# Patient Record
Sex: FEMALE | Race: WHITE | NOT HISPANIC OR LATINO | Employment: OTHER | URBAN - NONMETROPOLITAN AREA
[De-identification: names, ages, dates, MRNs, and addresses within clinical notes are randomized per-mention and may not be internally consistent; named-entity substitution may affect disease eponyms.]

---

## 2018-01-11 ENCOUNTER — OFFICE VISIT CONVERTED (OUTPATIENT)
Dept: FAMILY MEDICINE CLINIC | Age: 58
End: 2018-01-11
Attending: NURSE PRACTITIONER

## 2018-01-11 ENCOUNTER — CONVERSION ENCOUNTER (OUTPATIENT)
Dept: OTHER | Facility: HOSPITAL | Age: 58
End: 2018-01-11

## 2018-06-25 ENCOUNTER — OFFICE VISIT CONVERTED (OUTPATIENT)
Dept: FAMILY MEDICINE CLINIC | Age: 58
End: 2018-06-25
Attending: NURSE PRACTITIONER

## 2019-01-10 ENCOUNTER — OFFICE VISIT CONVERTED (OUTPATIENT)
Dept: FAMILY MEDICINE CLINIC | Age: 59
End: 2019-01-10
Attending: NURSE PRACTITIONER

## 2019-01-10 ENCOUNTER — HOSPITAL ENCOUNTER (OUTPATIENT)
Dept: OTHER | Facility: HOSPITAL | Age: 59
Discharge: HOME | End: 2019-01-10
Attending: NURSE PRACTITIONER

## 2019-01-10 LAB
ALBUMIN SERPL-MCNC: 3.9 G/DL (ref 3.5–5)
ALBUMIN/GLOB SERPL: 1.3 {RATIO} (ref 1.4–2.6)
ALP SERPL-CCNC: 59 U/L (ref 53–141)
ALT SERPL-CCNC: 19 U/L (ref 10–40)
ANION GAP SERPL CALC-SCNC: 15 MMOL/L (ref 8–19)
AST SERPL-CCNC: 19 U/L (ref 15–50)
BILIRUB SERPL-MCNC: 0.3 MG/DL (ref 0.2–1.3)
BUN SERPL-MCNC: 17 MG/DL (ref 5–25)
BUN/CREAT SERPL: 16 {RATIO} (ref 6–20)
CALCIUM SERPL-MCNC: 9.3 MG/DL (ref 8.7–10.4)
CHLORIDE SERPL-SCNC: 101 MMOL/L (ref 99–111)
CHOLEST SERPL-MCNC: 207 MG/DL (ref 107–200)
CHOLEST/HDLC SERPL: 3.8 {RATIO} (ref 3–6)
CONV CO2: 26 MMOL/L (ref 22–32)
CONV TOTAL PROTEIN: 6.8 G/DL (ref 6.3–8.2)
CREAT UR-MCNC: 1.06 MG/DL (ref 0.5–0.9)
GFR SERPLBLD BASED ON 1.73 SQ M-ARVRAT: 57 ML/MIN/{1.73_M2}
GLOBULIN UR ELPH-MCNC: 2.9 G/DL (ref 2–3.5)
GLUCOSE SERPL-MCNC: 84 MG/DL (ref 65–99)
HDLC SERPL-MCNC: 54 MG/DL (ref 40–60)
LDLC SERPL CALC-MCNC: 118 MG/DL (ref 70–100)
OSMOLALITY SERPL CALC.SUM OF ELEC: 287 MOSM/KG (ref 273–304)
POTASSIUM SERPL-SCNC: 4.2 MMOL/L (ref 3.5–5.3)
SODIUM SERPL-SCNC: 138 MMOL/L (ref 135–147)
TRIGL SERPL-MCNC: 177 MG/DL (ref 40–150)
VLDLC SERPL-MCNC: 35 MG/DL (ref 5–37)

## 2019-01-22 ENCOUNTER — HOSPITAL ENCOUNTER (OUTPATIENT)
Dept: OTHER | Facility: HOSPITAL | Age: 59
Discharge: HOME | End: 2019-01-22
Attending: NURSE PRACTITIONER

## 2019-08-20 ENCOUNTER — OFFICE VISIT CONVERTED (OUTPATIENT)
Dept: FAMILY MEDICINE CLINIC | Age: 59
End: 2019-08-20
Attending: NURSE PRACTITIONER

## 2019-08-26 ENCOUNTER — OFFICE VISIT CONVERTED (OUTPATIENT)
Dept: FAMILY MEDICINE CLINIC | Age: 59
End: 2019-08-26
Attending: FAMILY MEDICINE

## 2019-09-11 ENCOUNTER — HOSPITAL ENCOUNTER (OUTPATIENT)
Dept: OTHER | Facility: HOSPITAL | Age: 59
Discharge: HOME | End: 2019-09-11
Attending: NURSE PRACTITIONER

## 2019-09-11 LAB
ALBUMIN SERPL-MCNC: 4.2 G/DL (ref 3.5–5)
ALBUMIN/GLOB SERPL: 1.4 {RATIO} (ref 1.4–2.6)
ALP SERPL-CCNC: 68 U/L (ref 53–141)
ALT SERPL-CCNC: 19 U/L (ref 10–40)
ANION GAP SERPL CALC-SCNC: 17 MMOL/L (ref 8–19)
AST SERPL-CCNC: 20 U/L (ref 15–50)
BILIRUB SERPL-MCNC: 0.36 MG/DL (ref 0.2–1.3)
BUN SERPL-MCNC: 14 MG/DL (ref 5–25)
BUN/CREAT SERPL: 13 {RATIO} (ref 6–20)
CALCIUM SERPL-MCNC: 9.5 MG/DL (ref 8.7–10.4)
CHLORIDE SERPL-SCNC: 102 MMOL/L (ref 99–111)
CHOLEST SERPL-MCNC: 211 MG/DL (ref 107–200)
CHOLEST/HDLC SERPL: 3.8 {RATIO} (ref 3–6)
CONV CO2: 26 MMOL/L (ref 22–32)
CONV TOTAL PROTEIN: 7.1 G/DL (ref 6.3–8.2)
CREAT UR-MCNC: 1.06 MG/DL (ref 0.5–0.9)
GFR SERPLBLD BASED ON 1.73 SQ M-ARVRAT: 57 ML/MIN/{1.73_M2}
GLOBULIN UR ELPH-MCNC: 2.9 G/DL (ref 2–3.5)
GLUCOSE SERPL-MCNC: 101 MG/DL (ref 65–99)
HDLC SERPL-MCNC: 55 MG/DL (ref 40–60)
LDLC SERPL CALC-MCNC: 132 MG/DL (ref 70–100)
OSMOLALITY SERPL CALC.SUM OF ELEC: 293 MOSM/KG (ref 273–304)
POTASSIUM SERPL-SCNC: 4.1 MMOL/L (ref 3.5–5.3)
SODIUM SERPL-SCNC: 141 MMOL/L (ref 135–147)
TRIGL SERPL-MCNC: 120 MG/DL (ref 40–150)
VLDLC SERPL-MCNC: 24 MG/DL (ref 5–37)

## 2020-04-21 ENCOUNTER — OFFICE VISIT CONVERTED (OUTPATIENT)
Dept: FAMILY MEDICINE CLINIC | Age: 60
End: 2020-04-21
Attending: NURSE PRACTITIONER

## 2020-07-01 ENCOUNTER — OFFICE VISIT CONVERTED (OUTPATIENT)
Dept: FAMILY MEDICINE CLINIC | Age: 60
End: 2020-07-01
Attending: NURSE PRACTITIONER

## 2020-07-02 ENCOUNTER — OFFICE VISIT CONVERTED (OUTPATIENT)
Dept: OTOLARYNGOLOGY | Facility: CLINIC | Age: 60
End: 2020-07-02
Attending: OTOLARYNGOLOGY

## 2021-01-12 ENCOUNTER — OFFICE VISIT CONVERTED (OUTPATIENT)
Dept: FAMILY MEDICINE CLINIC | Age: 61
End: 2021-01-12
Attending: NURSE PRACTITIONER

## 2021-01-14 ENCOUNTER — HOSPITAL ENCOUNTER (OUTPATIENT)
Dept: OTHER | Facility: HOSPITAL | Age: 61
Discharge: HOME | End: 2021-01-14
Attending: NURSE PRACTITIONER

## 2021-01-14 LAB
ALBUMIN SERPL-MCNC: 4.2 G/DL (ref 3.5–5)
ALBUMIN/GLOB SERPL: 1.4 {RATIO} (ref 1.4–2.6)
ALP SERPL-CCNC: 66 U/L (ref 43–160)
ALT SERPL-CCNC: 20 U/L (ref 10–40)
ANION GAP SERPL CALC-SCNC: 15 MMOL/L (ref 8–19)
AST SERPL-CCNC: 20 U/L (ref 15–50)
BILIRUB SERPL-MCNC: 0.43 MG/DL (ref 0.2–1.3)
BUN SERPL-MCNC: 15 MG/DL (ref 5–25)
BUN/CREAT SERPL: 12 {RATIO} (ref 6–20)
CALCIUM SERPL-MCNC: 9.8 MG/DL (ref 8.7–10.4)
CHLORIDE SERPL-SCNC: 105 MMOL/L (ref 99–111)
CHOLEST SERPL-MCNC: 149 MG/DL (ref 107–200)
CHOLEST/HDLC SERPL: 2.8 {RATIO} (ref 3–6)
CONV CO2: 24 MMOL/L (ref 22–32)
CONV TOTAL PROTEIN: 7.2 G/DL (ref 6.3–8.2)
CREAT UR-MCNC: 1.28 MG/DL (ref 0.5–0.9)
GFR SERPLBLD BASED ON 1.73 SQ M-ARVRAT: 45 ML/MIN/{1.73_M2}
GLOBULIN UR ELPH-MCNC: 3 G/DL (ref 2–3.5)
GLUCOSE SERPL-MCNC: 99 MG/DL (ref 65–99)
HDLC SERPL-MCNC: 54 MG/DL (ref 40–60)
LDLC SERPL CALC-MCNC: 80 MG/DL (ref 70–100)
OSMOLALITY SERPL CALC.SUM OF ELEC: 291 MOSM/KG (ref 273–304)
POTASSIUM SERPL-SCNC: 4.2 MMOL/L (ref 3.5–5.3)
SODIUM SERPL-SCNC: 140 MMOL/L (ref 135–147)
TRIGL SERPL-MCNC: 75 MG/DL (ref 40–150)
VLDLC SERPL-MCNC: 15 MG/DL (ref 5–37)

## 2021-02-09 ENCOUNTER — HOSPITAL ENCOUNTER (OUTPATIENT)
Dept: OTHER | Facility: HOSPITAL | Age: 61
Discharge: HOME | End: 2021-02-09
Attending: NURSE PRACTITIONER

## 2021-02-09 LAB
ANION GAP SERPL CALC-SCNC: 16 MMOL/L (ref 8–19)
BUN SERPL-MCNC: 16 MG/DL (ref 5–25)
BUN/CREAT SERPL: 15 {RATIO} (ref 6–20)
CALCIUM SERPL-MCNC: 9.6 MG/DL (ref 8.7–10.4)
CHLORIDE SERPL-SCNC: 103 MMOL/L (ref 99–111)
CONV CO2: 25 MMOL/L (ref 22–32)
CREAT UR-MCNC: 1.1 MG/DL (ref 0.5–0.9)
GFR SERPLBLD BASED ON 1.73 SQ M-ARVRAT: 54 ML/MIN/{1.73_M2}
GLUCOSE SERPL-MCNC: 107 MG/DL (ref 65–99)
OSMOLALITY SERPL CALC.SUM OF ELEC: 290 MOSM/KG (ref 273–304)
POTASSIUM SERPL-SCNC: 4.6 MMOL/L (ref 3.5–5.3)
SODIUM SERPL-SCNC: 139 MMOL/L (ref 135–147)

## 2021-05-10 NOTE — H&P
History and Physical      Patient Name: Madai Ervin   Patient ID: [id number]   Sex: Female   YOB: 1960    Primary Care Provider: Aleah ROPER   Referring Provider: Aleah ROPER    Visit Date: July 2, 2020    Provider: Star Dobbs MD   Location: [facility]   Location Address: [address] [place] [address], [address]  [id number]   Location Phone: [phone]          Chief Complaint     1.  Hearing loss    2.  Tinnitus       History Of Present Illness  Madai Ervin is a 60 year old /White female who presents to the office today as a consult from Aleah ROPER.      She presents to the clinic today for evaluation of her ears and hearing.  She reports a longstanding history with ear issues.  She reports that when she was a child she had significant ear infections in her left ear and is unable to hear out of her left ear.  She reports her right ear she has noticed a decrease in her hearing over the past several years.  She also has right-sided, high-pitched tinnitus.  Approximately 10 days ago she started to notice an increase in the volume of the right-sided tinnitus.  She does not feel like her hearing in that ear change at all.  She was seen by her primary care provider who started her on a steroid pack.  She denies any otalgia or otorrhea.  She does have quite a lot of difficulty in hearing on a day-to-day basis.  She has bald and amplification system that she rarely uses.  She has not had an audiogram in many years.       Past Medical History  Anxiety; Hearing loss; Hyperlipidemia NEC/NOS; Hypertension, Benign Essential         Past Surgical History  EYE SURGERY; Hysterectomy         Medication List  Aspir-81 81 mg oral tablet,delayed release (DR/EC); Lescol 40 mg oral capsule; Lexapro 20 mg oral tablet; lisinopril 5 mg oral tablet; Vitamin D3 25 mcg (1,000 unit) oral capsule; Zyrtec 10 mg oral tablet         Allergy List  NO  "KNOWN DRUG ALLERGIES         Family Medical History  Family history of cancer; Family history of heart disease         Social History  Tobacco (Never)         Review of Systems  · Constitutional  o Denies  o : fever, night sweats, weight loss  · Eyes  o Denies  o : discharge from eye, impaired vision  · HENT  o Admits  o : *See HPI  · Cardiovascular  o Denies  o : chest pain, irregular heart beats  · Respiratory  o Denies  o : shortness of breath, wheezing, coughing up blood  · Gastrointestinal  o Denies  o : heartburn, reflux, vomiting blood  · Genitourinary  o Denies  o : frequency  · Integument  o Denies  o : rash, skin dryness  · Neurologic  o Denies  o : seizures, loss of balance, loss of consciousness, dizziness  · Endocrine  o Denies  o : cold intolerance, heat intolerance  · Heme-Lymph  o Denies  o : easy bleeding, anemia      Vitals  Date Time BP Position Site L\R Cuff Size HR RR TEMP (F) WT  HT  BMI kg/m2 BSA m2 O2 Sat HC       07/02/2020 10:32 AM        97.2 151lbs 0oz 5'  3\" 26.75 1.74           Physical Examination  · Constitutional  o Appearance  o : well developed, well-nourished, alert and in no acute distress, voice clear and strong  · Head and Face  o Head  o :   § Inspection  § : no deformities or lesions  o Face  o :   § Inspection  § : No facial lesions; House-Brackmann I/VI bilaterally  § Palpation  § : No TMJ crepitus nor  muscle tenderness bilaterally  · Eyes  o Vision  o :   § Visual Fields  § : Extraocular movements are intact. No spontaneous or gaze-induced nystagmus.  o Conjunctivae  o : clear  o Sclerae  o : clear  o Pupils and Irises  o : pupils equal, round, and reactive to light.   · Ears, Nose, Mouth and Throat  o Ears  o :   § External Ears  § : appearance within normal limits, no lesions present  § Otoscopic Examination  § : tympanic membrane appearance within normal limits bilaterally without perforations, well-aerated middle ears  § Hearing  § : intact to conversational " voice both ears  o Nose  o :   § External Nose  § : appearance normal  § Intranasal Exam  § : mucosa within normal limits, vestibules normal, no intranasal lesions present, septum midline, sinuses non tender to percussion  o Oral Cavity  o :   § Oral Mucosa  § : oral mucosa normal without pallor or cyanosis  § Lips  § : lip appearance normal  § Teeth  § : normal dentition for age  § Gums  § : gums pink, non-swollen, no bleeding present  § Tongue  § : tongue appearance normal; normal mobility  § Palate  § : hard palate normal, soft palate appearance normal with symmetric mobility  o Throat  o :   § Oropharynx  § : no inflammation or lesions present, tonsils within normal limits  § Hypopharynx  § : appearance within normal limits, superior epiglottis within normal limits  § Larynx  § : appearance within normal limits, vocal cords within normal limits, no lesions present  · Neck  o Inspection/Palpation  o : normal appearance, no masses or tenderness, trachea midline; thyroid size normal, nontender, no nodules or masses present on palpation  · Respiratory  o Respiratory Effort  o : breathing unlabored  o Inspection of Chest  o : normal appearance, no retractions  · Cardiovascular  o Heart  o : regular rate and rhythm  · Lymphatic  o Neck  o : no lymphadenopathy present  o Supraclavicular Nodes  o : no lymphadenopathy present  o Preauricular Nodes  o : no lymphadenopathy present  · Skin and Subcutaneous Tissue  o General Inspection  o : Regarding face and neck - there are no rashes present, no lesions present, and no areas of discoloration  · Neurologic  o Cranial Nerves  o : cranial nerves II-XII are grossly intact bilaterally  o Gait and Station  o : normal gait, able to stand without diffculty  · Psychiatric  o Judgement and Insight  o : judgment and insight intact  o Mood and Affect  o : mood normal, affect appropriate              Assessment  · Hearing loss     389.9/H91.90  · Tinnitus     388.30/H93.19    Problems  Reconciled  Plan  · Orders  o Audiometry, pure-tone (threshold); [facility] (44259) - 389.9/H91.90, 388.30/H93.19 - 07/06/2020  o Tympanogram (Impedance Testing) [facility] (91561) - 389.9/H91.90, 388.30/H93.19 - 07/06/2020  · Medications  o Medications have been Reconciled  o Transition of Care or Provider Policy  · Instructions  o She presents to the clinic today for evaluation of her ears and hearing. She reports a longstanding history with ear issues. She reports that when she was a child she had significant ear infections in her left ear and is unable to hear out of her left ear. She reports her right ear she has noticed a decrease in her hearing over the past several years. She also has right-sided, high-pitched tinnitus. Approximately 10 days ago she started to notice an increase in the volume of the right-sided tinnitus. She does not feel like her hearing in that ear change at all. She was seen by her primary care provider who started her on a steroid pack. She denies any otalgia or otorrhea. She does have quite a lot of difficulty in hearing on a day-to-day basis. She has bald and amplification system that she rarely uses. She has not had an audiogram in many years. On examination today bilateral tympanic membranes are within normal limits, without perforations. Her ears are well-aerated bilaterally. We did obtain an audiogram and tympanogram. The audiogram shows right-sided moderate sensorineural hearing loss rising to normal hearing at 1000 Hz and then sloping back down to profound sensorineural hearing loss. The left ear shows severe to profound sensorineural hearing loss. We were unable to test speech reception or word discrimination scores in the left ear. SRT in the right ear was at 35 dB and word discrimination scores were 88% at 70 dB. Tympanograms were within normal limits bilaterally. We do not have any records of any previous audiograms on her. She reports that it has been many many years since she  has had 1. I will have her start on a low-dose melatonin nightly to help with the tinnitus symptoms and have her finish her steroid pack. However, I do not feel like she is having a case of sudden sensorineural hearing loss. I encouraged her to look into hearing aids as I think this would be a great benefit to her. She does not seem to use her amplification system often and I feel like a hearing aid that is more suited to her specific hearing loss would be more beneficial. We have given her a copy of her audiogram today. I will plan to see her back on an as-needed basis.  o Electronically Identified Patient Education Materials Provided Electronically            Electronically Signed by: Star Dobbs MD -Author on August 25, 2020 04:23:11 PM

## 2021-05-15 VITALS — BODY MASS INDEX: 26.75 KG/M2 | TEMPERATURE: 97.2 F | HEIGHT: 63 IN | WEIGHT: 151 LBS

## 2021-05-18 NOTE — PROGRESS NOTES
Madai Ervin 1960     Office/Outpatient Visit    Visit Date: Thu, Josue 10, 2019 10:06 am    Provider: Aleah Garcia N.P. (Assistant: Temi Dia MA)    Location: [facility]        Electronically signed by Aleah Garcia N.P. on  01/10/2019 11:17:58 AM                             SUBJECTIVE:        CC:     Fabiola is a 59 year old White female.  This is a follow-up visit.  check up for med refills;         HPI:         Patient to be evaluated for anxiety.  Current treatment includes Lexapro.          Dx with hypercholesterolemia; current treatment includes Lescol.  Compliance with treatment has been good; she takes her medication as directed.  She denies experiencing any hypercholesterolemia related symptoms.  Most recent lab tests include ALT (SGPT):  19 (U/L) (07/06/2018), Creatinine, Serum:  1.17 (mg/dl) (11/19/2018), Glom Filt Rate, Est:  51 (ml/min/1.73m2) (11/19/2018), HDL:  60 (mg/dL) (07/06/2018), LDL:  102 (mg/dL) (07/06/2018), Total Cholesterol:  183 (mg/dL) (07/06/2018), Triglycerides:  106 (mg/dL) (07/06/2018).          Concerning essential hypertension, her current cardiac medication regimen includes an ACE inhibitor ( Zestril ).  Fabiola does not check her blood pressure other than at her clinic appointments.  She is tolerating the medication well without side effects.  Compliance with treatment has been good; she takes her medication as directed.          Additionally, she presents with history of screening breast exam - other.  her latest mammogram was 1-16-18.      ROS:     CONSTITUTIONAL:  Negative for chills, fatigue, fever, and weight change.      CARDIOVASCULAR:  Negative for chest pain, palpitations, tachycardia, orthopnea, and edema.      RESPIRATORY:  Negative for cough, dyspnea, and hemoptysis.      GASTROINTESTINAL:  Negative for melena and change in BM's.      INTEGUMENTARY/BREAST:  Negative for performance of self breast exams.      NEUROLOGICAL:  Negative for  dizziness, headaches, paresthesias, and weakness.      PSYCHIATRIC:  Positive for anxiety ( (when she comes in office and has to have blood work) ).          PMH/FMH/SH:     Last Reviewed on 1/10/2019 11:14 AM by Aleah Garcia    Past Medical History:         Hyperlipidemia         GYNECOLOGICAL HISTORY:    ; 1 adopted         PREVENTIVE HEALTH MAINTENANCE             BONE DENSITY: was last done 3/8/14 with the following abnormality noted-- osteopenia     COLORECTAL CANCER SCREENING: Up to date (colonoscopy q10y; sigmoidoscopy q5y; Cologuard q3y) was last done 09, Results are in chart; colonoscopy with the following abnormalities noted-- diverticulosis     Hepatitis C Medicare Screening: was last done ; negative     MAMMOGRAM: Done within last 2 years and results in are chart was last done 18 stable         Surgical History:         Hysterectomy: ; Other Surgeries    abdominal surgery/ovarian cyst;    eye surgery ;         Family History:     Father:  at age 30's; Cause of death was accidental death     Mother: Coronary Artery Disease; Hypertension; Hyperlipidemia     Brother(s): 4 brother(s) total; 1 ;  Lymphoma     Sister(s): 3 sister(s) total;  Cancer (unspecified type)     Paternal Grandfather:      Paternal Grandmother: ;  Type 2 Diabetes     Maternal Grandfather: Cause of death was heart     Maternal Grandmother:  at age 30's; Cause of death was heart         Social History:     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          Alcohol:  Does not drink alcohol and never has.          Substance Abuse History:     None             Immunizations:     Hep A, adult dose 2018     Hep A, adult dose 2018     Flulaval 2011     Fluzone (3 + years dose)  11/29/2012     Fluzone pf (3+ years dose) 11/20/2013     Fluzone Quadrivalent (3+ years) 11/19/2018     Influenza A (H1N1) NASAL, Monovalent Live 12/11/2009     Adacel (Tdap) 5/5/2010         Allergies:     Last Reviewed on 1/10/2019 10:11 AM by Temi Dia      No Known Drug Allergies.         Current Medications:     Last Reviewed on 1/10/2019 10:13 AM by Temi Dia    Lexapro 20mg Tablet Take 1 tablet(s) by mouth daily     Lescol XL 80mg Tablets, Extended Release Take 1 tablet(s) by mouth each evening     Lisinopril 5mg Tablet 1 tab daily     Valtrex 1gm Tablet One tablet BID X 2 days PRN     Aspirin (ASA) 81mg Chewable Tablet Chew 1 tablet(s) by mouth daily     Zyrtec 10mg Tablet 1 tab daily     Vitamin D3 1,000IU Tablet 3 tablets daily         OBJECTIVE:        Vitals:         Current: 1/10/2019 10:15:12 AM    Ht:  5 ft, 3 in;  Wt: 150.4 lbs;  BMI: 26.6    T: 98.4 F (oral);  BP: 150/84 mm Hg (left arm, sitting);  P: 69 bpm (left arm (BP Cuff), sitting);  sCr: 1.17 mg/dL;  GFR: 50.28        Repeat:     10:37:09 AM     BP:   144/82mm Hg (left arm, sitting)         Exams:     PHYSICAL EXAM:     GENERAL: vital signs recorded - well developed, well nourished;  no apparent distress;     NECK: carotid exam reveals no bruits;     RESPIRATORY: normal respiratory rate and pattern with no distress; normal breath sounds with no rales, rhonchi, wheezes or rubs;     CARDIOVASCULAR: normal rate; rhythm is regular;  no systolic murmur; no edema;     LYMPHATIC: no enlargement of cervical or facial nodes;     BREAST/INTEGUMENT: BREASTS: breast exam is normal without masses, skin changes, or nipple discharge;     PSYCHIATRIC:  appropriate affect and demeanor; normal speech pattern; grossly normal memory;         ASSESSMENT           300.02   F41.9  Anxiety              DDx:     272.0   E78.2  Hypercholesterolemia              DDx:     401.1   I10  Essential hypertension              DDx:     V76.19   Z12.39   Screening breast exam - other              DDx:     V76.51   Z12.11  Screening for cancer of colon              DDx:         ORDERS:         Meds Prescribed:       Refill of: Lexapro (Escitalopram Oxalate) 20mg Tablet Take 1 tablet(s) by mouth daily  #90 (Ninety) tablet(s) Refills: 1       Refill of: Lescol XL (Fluvastatin) 80mg Tablets, Extended Release Take 1 tablet(s) by mouth each evening  #90 (Ninety) tablet(s) Refills: 1       Refill of: Lisinopril 5mg Tablet 1 tab daily  #90 (Ninety) tablet(s) Refills: 1         Radiology/Test Orders:       [id number]  Screening digital breast tomosynthesis bi  (Send-Out)           Lab Orders:       [id number]  [facility] CMP AND LIPID: [id number], [id number]  (Send-Out)           Procedures Ordered:       REFER  Referral to Specialist or Other Facility  (Send-Out)                   PLAN:          Anxiety           Prescriptions:       Refill of: Lexapro (Escitalopram Oxalate) 20mg Tablet Take 1 tablet(s) by mouth daily  #90 (Ninety) tablet(s) Refills: 1          Hypercholesterolemia     LABORATORY:  Labs ordered to be performed today include HTN/Lipid Panel: CMP, Lipid.            Prescriptions:       Refill of: Lescol XL (Fluvastatin) 80mg Tablets, Extended Release Take 1 tablet(s) by mouth each evening  #90 (Ninety) tablet(s) Refills: 1           Orders:       [id number]  [facility] CMP AND LIPID: [id number], [id number]  (Send-Out)            Essential hypertension         RECOMMENDATIONS given include: perform routine monitoring of blood pressure with home blood pressure cuff, reduction of dietary salt intake, and stress reduction.      FOLLOW-UP: Schedule a follow-up visit in 6 months.            Prescriptions:       Refill of: Lisinopril 5mg Tablet 1 tab daily  #90 (Ninety) tablet(s) Refills: 1          Screening breast exam - other had flu and hep a vaccine /does not do BSE /after jan 16         RADIOLOGY:  I have ordered Screening 3D Mammogram Bilateral to be done today.            Orders:        [id number]  Screening digital breast tomosynthesis bi  (Send-Out)            Screening for cancer of colon requested dr tapia         REFERRALS:  Referral initiated to a general surgeon ( Dr. Faizan Tapia; a colonoscopy ).            Orders:       REFER  Referral to Specialist or Other Facility  (Send-Out)             Patient Education Handouts:       Colonoscopy              Patient Recommendations:        For  Essential hypertension:     Begin monitoring your blood pressure by brief nurse visits at our office, a home blood pressure monitor, or by checking on the machines in pharmacies or stores.  Keep a log of the readings. Reduce the amount of salt in your diet. Try and reduce the effects of stress on blood pressure by relaxation, meditation, biofeedback, exercise or other stress reduction methods.  Schedule a follow-up visit in 6 months.              CHARGE CAPTURE           **Please note: ICD descriptions below are intended for billing purposes only and may not represent clinical diagnoses**        Primary Diagnosis:         300.02 Anxiety            F41.9    Anxiety disorder, unspecified              Orders:          [id number]   Office/outpatient visit; established patient, level 4  (In-House)           272.0 Hypercholesterolemia            E78.2    Mixed hyperlipidemia    401.1 Essential hypertension            I10    Essential (primary) hypertension    V76.19 Screening breast exam - other            Z12.39    Encounter for other screening for malignant neoplasm of breast    V76.51 Screening for cancer of colon            Z12.11    Encounter for screening for malignant neoplasm of colon        ADDENDUMS:      ____________________________________    Addendum: 01/11/2019 04:54 PM - [facility]         Visit Note Faxed to:        Faizan Tapia  (General Surgery); Number [phone]
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
## 2021-05-18 NOTE — PROGRESS NOTES
Madai Ervin 1960     Office/Outpatient Visit    Visit Date: Tue, Aug 20, 2019 09:49 am    Provider: Aleah Garcia N.P. (Assistant: Temi Dia MA)    Location: [facility]        Electronically signed by Aleah Garcia N.P. on  2019 12:19:28 PM                             SUBJECTIVE:        CC:     Fabiola is a 59 year old White female.  This is a follow-up visit.  med refills;         HPI:         Fabiola presents with hypercholesterolemia.  Current treatment includes Lescol.  Compliance with treatment has been good; she takes her medication as directed.  She denies experiencing any hypercholesterolemia related symptoms.  Most recent lab tests include Weight (lb):  150.2 (2019), Systolic BP:  133 (2019), Diastolic BP:  78 (2019), Total Cholesterol:  207 (mg/dL) (01/10/2019), Triglycerides:  177 (mg/dL) (01/10/2019), LDL:  118 (mg/dL) (01/10/2019), ALT (SGPT):  19 (U/L) (01/10/2019).          Additionally, she presents with history of essential hypertension.  her current cardiac medication regimen includes an ACE inhibitor ( Prinivil ).  Fabiola does not check her blood pressure other than at her clinic appointments.  She is tolerating the medication well without side effects.  Compliance with treatment has been good; she takes her medication as directed.      ROS:     CONSTITUTIONAL:  Negative for chills, fatigue, fever, and weight change.      CARDIOVASCULAR:  Negative for chest pain, palpitations, tachycardia, orthopnea, and edema.      RESPIRATORY:  Negative for cough, dyspnea, and hemoptysis.      NEUROLOGICAL:  Negative for dizziness, headaches, paresthesias, and weakness.      PSYCHIATRIC:  Positive for doing well on lexapro, wants refills.          PM/FMH/SH:     Last Reviewed on 2019 10:00 AM by Aleah Garcia    Past Medical History:         Hyperlipidemia         GYNECOLOGICAL HISTORY:    ; 1 adopted         PREVENTIVE HEALTH MAINTENANCE              BONE DENSITY: was last done 3/8/14 with the following abnormality noted-- osteopenia     COLORECTAL CANCER SCREENING: Up to date (colonoscopy q10y; sigmoidoscopy q5y; Cologuard q3y) was last done 09, Results are in chart; colonoscopy with the following abnormalities noted-- diverticulosis     Hepatitis C Medicare Screening: was last done ; negative     MAMMOGRAM: Done within last 2 years and results in are chart was last done 18 stable         Surgical History:         Hysterectomy: ; Other Surgeries    abdominal surgery/ovarian cyst;    eye surgery ;         Family History:     Father:  at age 30's; Cause of death was accidental death     Mother: Coronary Artery Disease; Hypertension; Hyperlipidemia     Brother(s): 4 brother(s) total; 1 ;  Lymphoma     Sister(s): 3 sister(s) total;  Cancer (unspecified type)     Paternal Grandfather:      Paternal Grandmother: ;  Type 2 Diabetes     Maternal Grandfather: Cause of death was heart     Maternal Grandmother:  at age 30's; Cause of death was heart         Social History:     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          Alcohol:  Does not drink alcohol and never has.          Substance Abuse History:     None             Immunizations:     Hep A, adult dose 2018     Hep A, adult dose 2018     Flulaval 2011     Fluzone (3 + years dose) 2012     Fluzone pf (3+ years dose) 2013     Fluzone Quadrivalent (3+ years) 2018     Influenza A (H1N1) NASAL, Monovalent Live 2009     Adacel (Tdap) 2010         Allergies:     Last Reviewed on 2019 09:52 AM by Temi Dia      No Known Drug Allergies.         Current Medications:     Last Reviewed on 2019 09:53 AM by  Temi Dia    Lexapro 20mg Tablet Take 1 tablet(s) by mouth daily     Lescol XL 80mg Tablets, Extended Release Take 1 tablet(s) by mouth each evening     Lisinopril 5mg Tablet 1 tab daily     Valtrex 1gm Tablet One tablet BID X 2 days PRN     Aspirin (ASA) 81mg Chewable Tablet Chew 1 tablet(s) by mouth daily     Zyrtec 10mg Tablet 1 tab daily     Vitamin D3 1,000IU Tablet 3 tablets daily         OBJECTIVE:        Vitals:         Current: 8/20/2019 9:54:49 AM    Ht:  5 ft, 3 in;  Wt: 150.2 lbs;  BMI: 26.6    T: 97.3 F (oral);  BP: 133/78 mm Hg (left arm, sitting);  P: 64 bpm (left arm (BP Cuff), sitting);  sCr: 1.06 mg/dL;  GFR: 55.46        Exams:     PHYSICAL EXAM:     GENERAL: vital signs recorded - well developed, well nourished;  no apparent distress;     NECK: carotid exam reveals no bruits;     RESPIRATORY: normal respiratory rate and pattern with no distress; normal breath sounds with no rales, rhonchi, wheezes or rubs;     CARDIOVASCULAR: normal rate; rhythm is regular;  no systolic murmur; no edema;     PSYCHIATRIC:  appropriate affect and demeanor; normal speech pattern; grossly normal memory;         ASSESSMENT           272.0   E78.2  Hypercholesterolemia              DDx:     401.1   I10  Essential hypertension              DDx:     V76.51   Z12.11  Screening for cancer of colon              DDx:     300.02   F41.9  Anxiety              DDx:         ORDERS:         Meds Prescribed:       Refill of: Lexapro (Escitalopram Oxalate) 20mg Tablet Take 1 tablet(s) by mouth daily  #90 (Ninety) tablet(s) Refills: 0       Refill of: Lescol XL (Fluvastatin) 80mg Tablets, Extended Release Take 1 tablet(s) by mouth each evening  #90 (Ninety) tablet(s) Refills: 0       Refill of: Lisinopril 5mg Tablet 1 tab daily  #90 (Ninety) tablet(s) Refills: 0         Lab Orders:       FUTURE  Future order to be done at patients convenience  (Send-Out)         [id number]  [facility] CMP AND LIPID: [id number]  (Send-Out)            Procedures Ordered:       REFER  Referral to Specialist or Other Facility  (Send-Out)                   PLAN:          Hypercholesterolemia         FOLLOW-UP TESTING #1: FOLLOW-UP LABORATORY:  Labs to be scheduled in the future include HTN/Lipid Panel: CMP, Lipid.      RECOMMENDATIONS given include: exercise and low cholesterol/low fat diet.      FOLLOW-UP: fasting for labs           Prescriptions:       Refill of: Lescol XL (Fluvastatin) 80mg Tablets, Extended Release Take 1 tablet(s) by mouth each evening  #90 (Ninety) tablet(s) Refills: 0           Orders:       FUTURE  Future order to be done at patients convenience  (Send-Out)         [id number]  [facility] CMP AND LIPID: [id number]  (Send-Out)            Essential hypertension           Prescriptions:       Refill of: Lisinopril 5mg Tablet 1 tab daily  #90 (Ninety) tablet(s) Refills: 0          Screening for cancer of colon canceled Dr Modi, wants to see Dr Bautista         REFERRALS:  Referral initiated to a general surgeon ( Dr. Norm Bautista; a colonoscopy ).            Orders:       REFER  Referral to Specialist or Other Facility  (Send-Out)            Anxiety           Prescriptions:       Refill of: Lexapro (Escitalopram Oxalate) 20mg Tablet Take 1 tablet(s) by mouth daily  #90 (Ninety) tablet(s) Refills: 0             Patient Recommendations:        For  Hypercholesterolemia:             The following laboratory testing has been ordered: Maintain a regular exercise program. Reduce the amount of cholesterol and saturated fat in your diet.              CHARGE CAPTURE           **Please note: ICD descriptions below are intended for billing purposes only and may not represent clinical diagnoses**        Primary Diagnosis:         272.0 Hypercholesterolemia            E78.2    Mixed hyperlipidemia              Orders:          [id number]   Office/outpatient visit; established patient, level 4  (In-House)           401.1 Essential hypertension             I10    Essential (primary) hypertension    V76.51 Screening for cancer of colon            Z12.11    Encounter for screening for malignant neoplasm of colon    300.02 Anxiety            F41.9    Anxiety disorder, unspecified

## 2021-05-18 NOTE — PROGRESS NOTES
Madai Ervin 1960     Office/Outpatient Visit    Visit Date: Mon, Aug 26, 2019 10:43 am    Provider: Octavio Wakefield MD (Assistant: Beverley Galvez MA)    Location: [facility]        Electronically signed by Octavio Wakefield MD on  2019 12:50:19 PM                             SUBJECTIVE:        CC:     Fabiola is a 59 year old White female.  possible sinus infection?;         HPI:     Pt has nasal, sinus, and throat congestion. Fatigue. Sx started a couple days ago. Productive cough. Maybe a fever, unsure. No sore throat. She is on zyrtec.     ROS:     CONSTITUTIONAL:  Positive for fatigue and fever ( low grade ).      E/N/T:  Positive for nasal congestion and sinus pressure.      CARDIOVASCULAR:  Negative for chest pain, palpitations, tachycardia, orthopnea, and edema.      RESPIRATORY:  Positive for recent cough ( with scant amounts of purulent sputum ).      NEUROLOGICAL:  Negative for dizziness, headaches, paresthesias, and weakness.      PSYCHIATRIC:  Positive for doing well on lexapro, wants refills.          [facility]/:     Last Reviewed on 2019 11:21 AM by Octavio Wakefield    Past Medical History:         Hyperlipidemia         GYNECOLOGICAL HISTORY:    ; 1 adopted         PREVENTIVE HEALTH MAINTENANCE             BONE DENSITY: was last done 3/8/14 with the following abnormality noted-- osteopenia     COLORECTAL CANCER SCREENING: Up to date (colonoscopy q10y; sigmoidoscopy q5y; Cologuard q3y) was last done 09, Results are in chart; colonoscopy with the following abnormalities noted-- diverticulosis     Hepatitis C Medicare Screening: was last done ; negative     MAMMOGRAM: Done within last 2 years and results in are chart was last done 18 stable         Surgical History:         Hysterectomy: ; Other Surgeries    abdominal surgery/ovarian cyst;    eye surgery ;         Family History:     Father:  at age 30's; Cause of death was accidental  death     Mother: Coronary Artery Disease; Hypertension; Hyperlipidemia;  PUD     Brother(s): 4 brother(s) total; 1 ;  Lymphoma     Sister(s): 3 sister(s) total;  Cancer (unspecified type)     Paternal Grandfather:      Paternal Grandmother: ;  Type 2 Diabetes     Maternal Grandfather: Cause of death was heart     Maternal Grandmother:  at age 30's; Cause of death was heart         Social History:     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          Alcohol:  Does not drink alcohol and never has.          Substance Abuse History:     Last Reviewed on 2019 11:21 AM by Octavio Wakefield    None         Mental Health History:     Last Reviewed on 2019 11:21 AM by Octavio Wakefield        Communicable Diseases (eg STDs):     Last Reviewed on 2019 11:21 AM by Octavio Wakefield            Current Problems:     Last Reviewed on 2019 11:21 AM by Octavio Wakefield    Screening for cancer of colon     Family history of coronary artery disease     Body Aches, pain     Fatigue     Heel pain     Insect bite     Arm pain     Osteopenia     Use of high risk medications     Essential hypertension     Back pain     Hypercholesterolemia     Anxiety     Other specified forms of hearing loss, NEC     Acute maxillary sinusitis     Acute sinusitis, other         Immunizations:     Hep A, adult dose 2018     Hep A, adult dose 2018     Flulaval 2011     Fluzone (3 + years dose) 2012     Fluzone pf (3+ years dose) 2013     Fluzone Quadrivalent (3+ years) 2018     Influenza A (H1N1) NASAL, Monovalent Live 2009     Adacel (Tdap) 2010         Allergies:     Last Reviewed on 2019 11:21 AM by Octavio Wakefield      No Known Drug Allergies.         Current Medications:      Last Reviewed on 8/26/2019 11:21 AM by Octavio Wakefield    Lescol XL 80mg Tablets, Extended Release Take 1 tablet(s) by mouth each evening     Lexapro 20mg Tablet Take 1 tablet(s) by mouth daily     Lisinopril 5mg Tablet 1 tab daily     Valtrex 1gm Tablet One tablet BID X 2 days PRN     Aspirin (ASA) 81mg Chewable Tablet Chew 1 tablet(s) by mouth daily     Zyrtec 10mg Tablet 1 tab daily     Vitamin D3 1,000IU Tablet 3 tablets daily         OBJECTIVE:        Vitals:         Current: 8/26/2019 10:49:29 AM    Ht:  5 ft, 3 in;  Wt: 151.4 lbs;  BMI: 26.8    T: 98.7 F (oral);  BP: 126/80 mm Hg (right arm, sitting);  P: 78 bpm (right arm (BP Cuff), sitting);  sCr: 1.06 mg/dL;  GFR: 55.65        Exams:     PHYSICAL EXAM:     GENERAL: vital signs recorded - well developed, well nourished;  well groomed;  no apparent distress;     EYES: PERRL, EOMI     E/N/T: EARS:  normal external auditory canals and tympanic membranes;  grossly normal hearing; NOSE: nasal mucosa is boggy;  OROPHARYNX: posterior pharynx shows erythema;     NECK: range of motion is normal; thyroid exam reveals not enlarged;     RESPIRATORY: normal respiratory rate and pattern with no distress; normal breath sounds with no rales, rhonchi, wheezes or rubs;     CARDIOVASCULAR: normal rate; rhythm is regular;  no systolic murmur; no edema;     GASTROINTESTINAL: nontender; normal bowel sounds;     LYMPHATIC: no enlargement of cervical or facial nodes;     MUSCULOSKELETAL: normal gait;     NEUROLOGIC: mental status: alert and oriented x 3;     PSYCHIATRIC:  appropriate affect and demeanor; normal speech pattern; grossly normal memory;         ASSESSMENT           461.0   J01.00  Acute maxillary sinusitis              DDx:         ORDERS:         Meds Prescribed:       Flonase Allergy Relief (Fluticasone Propionate) 50mcg/1actuation Nasal Spray 1 spray eac nostril once daily x 2 weeks then once daily prn allergy symptoms  #16 (Sixteen) ml Refills: 0       Singulair   (Montelukast Sodium) 10mg Tablet Take 1 tablet(s) by mouth each evening  #30 (Thirty) tablet(s) Refills: 2                 PLAN:          Acute maxillary sinusitis Presentation c/w viral URI or seasonal allergies. If not better by Thursday or Friday, pt asha call in and I'll send in azirthomycin or doxycycline. Pt counseled to take flonase and singulair in addition to zyrtec.           Prescriptions:       Flonase Allergy Relief (Fluticasone Propionate) 50mcg/1actuation Nasal Spray 1 spray eac nostril once daily x 2 weeks then once daily prn allergy symptoms  #16 (Sixteen) ml Refills: 0       Singulair  (Montelukast Sodium) 10mg Tablet Take 1 tablet(s) by mouth each evening  #30 (Thirty) tablet(s) Refills: 2             CHARGE CAPTURE           **Please note: ICD descriptions below are intended for billing purposes only and may not represent clinical diagnoses**        Primary Diagnosis:         461.0 Acute maxillary sinusitis            J01.00    Acute maxillary sinusitis, unspecified              Orders:          35161   Office/outpatient visit; established patient, level 3  (In-House)

## 2021-05-18 NOTE — PROGRESS NOTES
Madai Ervin 1960     Office/Outpatient Visit    Visit Date: Thu, Jan 11, 2018 10:09 am    Provider: Aleah Garcia N.P. (Assistant: Ngoc Charles MA)    Location: [facility]        Electronically signed by Aleah Garcia N.P. on  01/11/2018 10:56:00 AM                             SUBJECTIVE:        CC:     Fabiola is a 58 year old White female.  Well Woman Exam;         HPI:         Patient to be evaluated for well Woman Exam.  She cannot recall when she last had a physical exam.  She is status-post hysterectomy.  She does not perform breast self-exams.    Her last Pap smear was in 11/29/2012 and was normal.   Her last mammogram was in 3/18/2014 and was normal.   Her last DEXA was in 3/18/2014 and showed osteopenia.   Preventative Health updated today.  Fabiola denies any history of abnormal Pap smears.  Tobacco: She has never smoked.          ROS:     CONSTITUTIONAL:  Negative for chills, fatigue, fever, and weight change.      EYES:  Negative for blurred vision, eye pain, and photophobia.      E/N/T:  Negative for hearing problems, E/N/T pain, congestion, rhinorrhea, epistaxis, hoarseness, and dental problems.      CARDIOVASCULAR:  Negative for chest pain, palpitations, tachycardia, orthopnea, and edema.      RESPIRATORY:  Negative for cough, dyspnea, and hemoptysis.      GASTROINTESTINAL:  Negative for abdominal pain, heartburn, constipation, diarrhea, and stool changes.      GENITOURINARY:  Negative for genital lesions, hematuria, menstrual problems, polyuria, abnormal vaginal bleeding, and vaginal discharge.      MUSCULOSKELETAL:  Negative for arthralgias, back pain, and myalgias.      INTEGUMENTARY/BREAST:  Negative for atypical moles, dry skin, pruritis, rashes, breast masses, and nipple discharge.      NEUROLOGICAL:  Negative for dizziness, headaches, paresthesias, and weakness.      ENDOCRINE:  Negative for hair loss, heat/cold intolerance, polydipsia, and polyphagia.       PSYCHIATRIC:  Negative for anxiety, depression, and sleep disturbances.          PMH/FMH/SH:     Last Reviewed on 2018 10:38 AM by Aleah Garcia    Past Medical History:         Hyperlipidemia         GYNECOLOGICAL HISTORY:    ; 1 adopted         PREVENTIVE HEALTH MAINTENANCE             BONE DENSITY: was last done 3/8/14 with the following abnormality noted-- osteopenia     COLORECTAL CANCER SCREENING: Up to date (colonoscopy q10y; sigmoidoscopy q5y; Cologuard q3y) was last done 09, Results are in chart; colonoscopy with the following abnormalities noted-- diverticulosis     MAMMOGRAM: was last done 3/8/14 with normal results         Surgical History:         Hysterectomy: ; Other Surgeries    abdominal surgery/ovarian cyst;    eye surgery ;         Family History:     Father:  at age 30's; Cause of death was accidental death     Mother: Coronary Artery Disease; Hypertension; Hyperlipidemia     Brother(s): 4 brother(s) total; 1 ;  Lymphoma     Sister(s): 3 sister(s) total;  Cancer (unspecified type)     Paternal Grandfather:      Paternal Grandmother: ;  Type 2 Diabetes     Maternal Grandfather: Cause of death was heart     Maternal Grandmother:  at age 30's; Cause of death was heart         Social History:     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          Alcohol:  Does not drink alcohol and never has.          Substance Abuse History:     None             Immunizations:     Flulaval 2011     Fluzone (3 + years dose) 2012     Fluzone pf (3+ years dose) 2013     Influenza A (H1N1) NASAL, Monovalent Live 2009     Adacel (Tdap) 2010         Allergies:     Last Reviewed on 2018 10:24 AM by Ngoc Charles      No Known Drug Allergies.          Current Medications:     Last Reviewed on 1/11/2018 10:25 AM by Ngoc Charles    Valtrex 1gm Tablet One tablet BID X 2 days PRN     Lescol XL 80mg Tablets, Extended Release Take 1 tablet(s) by mouth each evening     Lexapro 20mg Tablet Take 1 tablet(s) by mouth daily     Lisinopril 5mg Tablet 1 tab daily     Aspirin (ASA) 81mg Chewable Tablet Chew 1 tablet(s) by mouth daily     Vitamin D3 1,000IU Tablet 3 tablets daily     Zyrtec 10mg Tablet 1 tab daily         OBJECTIVE:        Vitals:         Current: 1/11/2018 10:17:09 AM    Ht:  5 ft, 3 in;  Wt: 144.3 lbs;  BMI: 25.6    T: 98.2 F (oral);  BP: 138/84 mm Hg (left arm, sitting);  P: 72 bpm (left arm (BP Cuff), sitting);  sCr: 0.99 mg/dL;  GFR: 59.09        Exams:     PHYSICAL EXAM:     GENERAL: vital signs recorded - well developed, well nourished;  no apparent distress;     EYES: extraocular movements intact; conjunctiva and cornea are normal; PERRLA;     E/N/T:  normal EACs, TMs, nasal/oral mucosa, teeth, gingiva, and oropharynx;     NECK: range of motion is normal; thyroid is non-palpable;     RESPIRATORY: normal respiratory rate and pattern with no distress; normal breath sounds with no rales, rhonchi, wheezes or rubs;     CARDIOVASCULAR: normal rate; rhythm is regular;  no systolic murmur; no edema;     GASTROINTESTINAL: nontender; normal bowel sounds; no organomegaly; rectal exam: normal tone; nontender, guaiac negative stool;     GENITOURINARY: external genitalia: normal without lesions or urethral abnormalities;; vagina: atrophic mucosa; ; cervix: absent (s/p hyst) noted;;  adnexa: normal with no masses or tenderness     LYMPHATIC: no enlargement of cervical or facial nodes; no axillary adenopathy;     BREAST/INTEGUMENT: BREASTS: breast exam is normal without masses, skin changes, or nipple discharge; SKIN: no significant rashes or lesions; no suspicious moles;     MUSCULOSKELETAL:  Normal range of motion, strength and tone;     NEUROLOGIC: GROSSLY  INTACT     PSYCHIATRIC:  appropriate affect and demeanor; normal speech pattern; grossly normal memory;         Lab/Test Results:             Glucose, Urine:  Neg (01/11/2018),     Bilirubin, urine:  Negative (01/11/2018),     Ketones, Urine Strip:  Negative (01/11/2018),     Specific Standard, urine:  1.030 (01/11/2018),     Blood in Urine:  negative (01/11/2018),     pH, urine:  5.5 (01/11/2018),     Protein Urine QL:  negative (01/11/2018),     Urobilinogen, urine:  0.2 E.U./dL (01/11/2018),     Nitrite, Urine:  Negative (01/11/2018),     Leukoctyes, urine:  Trace (01/11/2018),     Appearance:  Clear (01/11/2018),     collection source:  Clean-catch (01/11/2018),     Color:  Yellow (01/11/2018),     Performed by::  WONG (01/11/2018),             ASSESSMENT:           V72.31     Well Woman Exam     272.0   E78.5  Hypercholesterolemia              DDx:     V69.8   Z72.89  Other problems related to lifestyle              DDx:     V76.41   Z12.12  Screening for rectal cancer              DDx:         ORDERS:         Radiology/Test Orders:       [id number]  Screening mammography bi 2-view inc CAD  (Send-Out)           Lab Orders:       [id number]  Urinalysis, automated, without microscopy  (In-House)         FUTURE  Future order to be done at patients convenience  (Send-Out)         [id number]  [facility] - [facility] Hepatitis C antibody  (Send-Out)         FUTURE  Future order to be done at patients convenience  (Send-Out)         39183  [facility] - [facility] CMP AND LIPID: [id number], [id number]  (Send-Out)         57239  Occult blood, fecal  (In-House)                   PLAN:          Well Woman Exam declines dexa due to cost         RADIOLOGY:  I have ordered screening mammogram to be done today.      COUNSELING was provided today regarding the following topics: breast self-exam.      FOLLOW-UP: Schedule follow-up appointments on a p.r.n. basis.            Orders:      [id number]  Urinalysis, automated, without microscopy  (In-House)         03149  Screening  mammography bi 2-view inc CAD  (Send-Out)            Hypercholesterolemia         FOLLOW-UP TESTING #1: FOLLOW-UP LABORATORY:  Labs to be scheduled in the future include HTN/Lipid Panel: CMP, Lipid.      FOLLOW-UP: fasting for labs, order given           Orders:       FUTURE  Future order to be done at patients convenience  (Send-Out)         63441  [facility] CMP AND LIPID: [id number], [id number]  (Send-Out)            Other problems related to lifestyle         FOLLOW-UP TESTING #1: FOLLOW-UP LABORATORY:  Labs to be scheduled in the future include Hepatitis C  Screen Antibody.            Orders:       FUTURE  Future order to be done at patients convenience  (Send-Out)         63808  [facility] Hepatitis C antibody  (Send-Out)             Patient Education Handouts:       Hepatitis C           Screening for rectal cancer           Orders:       [id number]  Occult blood, fecal  (In-House)   [facility]             Patient Recommendations:        For  Well Woman Exam:         You should regularly examine your breasts, easily done while in the shower or with lotion.  Feel and look for differences in consistency from month to month, especially noting knots or lumps, changes in skin appearance, nipple retraction or discharge.  Schedule follow-up appointments as needed.          For  Hypercholesterolemia:             The following laboratory testing has been ordered:         For  Other problems related to lifestyle:             The following laboratory testing has been ordered:             CHARGE CAPTURE:           Primary Diagnosis:     V72.31    Well Woman Exam                Z01.419    Encounter for gynecological examination (general) (routine) without abnormal findings                       Orders:          [id number]   Preventive medicine, established patient, age 40-64 years  (In-House)             01135   Urinalysis, automated, without microscopy  (In-House)           272.0 Hypercholesterolemia            E78.5    Hyperlipidemia,  unspecified    V69.8 Other problems related to lifestyle            Z72.89    Other problems related to lifestyle    V76.41 Screening for rectal cancer            Z12.12    Encounter for screening for malignant neoplasm of rectum              Orders:          [id number]   Occult blood, fecal  (In-House)

## 2021-05-18 NOTE — PROGRESS NOTES
Madai Ervin  1960     Office/Outpatient Visit    Visit Date: Tue, Apr 21, 2020 10:30 am    Provider: Aleah Garcia N.P. (Assistant: Vianca Friedman MA)    Location: [facility]        Electronically signed by Aleah Garcia N.P. on  04/21/2020 10:50:10 AM                             Subjective:        CC: TELEHEALTH- CONSENT BY    VIDEO -[phone] is a 60 year old White female.  This is a follow-up visit.          HPI: TELEMEDICINE VISIT:    - Fabiola  consented to this telemedicine visit.    - Persons present during the telemedicine consultation include: Fabiola - patient, JEREMIE Garcia APRN    - This visit is being conducted over FaceTime with audio and video.                  Fabiola presents with mixed hyperlipidemia.  Current treatment includes Lescol.  Compliance with treatment has been good; she takes her medication as directed.  She denies experiencing any hypercholesterolemia related symptoms.  Most recent lab tests include Glucose, Serum:  101 (mg/dL) (09/11/2019), Creatinine, Serum:  1.06 (mg/dl) (09/11/2019), Glom Filt Rate, Est:  57 (ml/min/1.73m2) (09/11/2019), ALT (SGPT):  19 (U/L) (09/11/2019), AST (SGOT):  20 (U/L) (09/11/2019), Total Cholesterol:  211 (mg/dL) (09/11/2019), HDL:  55 (mg/dL) (09/11/2019), Triglycerides:  120 (mg/dL) (09/11/2019), LDL:  132 (mg/dL) (09/11/2019).            Additionally, she presents with history of anxiety disorder, unspecified.  current treatment includes Lexapro.  Doing well, needs a refill.           Essential (primary) hypertension details; her current cardiac medication regimen includes an ACE inhibitor ( Prinivil ).  Fabiola does not check her blood pressure other than at her clinic appointments.  She is tolerating the medication well without side effects.  Compliance with treatment has been good; she takes her medication as directed.      ROS:     CONSTITUTIONAL:  Negative for fever.      CARDIOVASCULAR:  Negative for chest  pain, palpitations, tachycardia, orthopnea, and edema.      RESPIRATORY:  Negative for recent cough and dyspnea.      NEUROLOGICAL:  Negative for dizziness, headaches, paresthesias, and weakness.          Past Medical History / Family History / Social History:         Last Reviewed on 2020 10:42 AM by Aleah Garcia    Past Medical History:         Hyperlipidemia         GYNECOLOGICAL HISTORY:    ; 1 adopted         PREVENTIVE HEALTH MAINTENANCE             BONE DENSITY: was last done 3/8/14 with the following abnormality noted-- osteopenia     COLORECTAL CANCER SCREENING: Up to date (colonoscopy q10y; sigmoidoscopy q5y; Cologuard q3y) was last done 09, Results are in chart; colonoscopy with the following abnormalities noted-- diverticulosis     Hepatitis C Medicare Screening: was last done ; negative     MAMMOGRAM: Done within last 2 years and results in are chart was last done 18 stable         Surgical History:         Hysterectomy: ; Other Surgeries    abdominal surgery/ovarian cyst;    eye surgery ;         Family History:     Father:  at age 30's; Cause of death was accidental death     Mother: Coronary Artery Disease; Hypertension; Hyperlipidemia;  PUD     Brother(s): 4 brother(s) total; 1 ;  Lymphoma     Sister(s): 3 sister(s) total;  Cancer (unspecified type)     Paternal Grandfather:      Paternal Grandmother: ;  Type 2 Diabetes     Maternal Grandfather: Cause of death was heart     Maternal Grandmother:  at age 30's; Cause of death was heart         Social History:     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          Alcohol:  Does not drink alcohol and never has.          Substance Abuse History:     Last Reviewed on 2019 11:21 AM by Ashu  Octavio GARCIA    None         Mental Health History:     Last Reviewed on 8/26/2019 11:21 AM by Octavio Wakefield        Communicable Diseases (eg STDs):     Last Reviewed on 8/26/2019 11:21 AM by Octavio Wakefield        Immunizations:     influenza, injectable, quadrivalent, preservative free (FLUZONE QUAD 7851-1499) 11/20/2019    Hep A, adult dose 4/25/2018    Hep A, adult dose 11/19/2018    Flulaval 9/19/2011    Fluzone (3 + years dose) 11/29/2012    Fluzone pf (3+ years dose) 11/20/2013    Fluzone Quadrivalent (3+ years) 11/19/2018    Influenza A (H1N1) NASAL, Monovalent Live 12/11/2009    Adacel (Tdap) 5/5/2010        Allergies:     Last Reviewed on 4/21/2020 10:30 AM by Vianca Friedman    No Known Allergies.        Current Medications:     Last Reviewed on 4/21/2020 10:31 AM by Vianca Friedman    Lescol XL 80mg Tablets, Extended Release [Take 1 tablet(s) by mouth each evening]    aspirin 81 mg oral tablet,chewable [Chew 1 tablet(s) by mouth daily]    Valtrex 1gm Tablet [One tablet BID X 2 days PRN]    escitalopram oxalate 20 mg oral tablet [TAKE 1 TABLET BY MOUTH EVERY DAY]    Vitamin D3 25 mcg (1,000 unit) oral tablet [3 tablets daily]    lisinopriL 5 mg oral tablet [TAKE 1 TABLET BY MOUTH EVERY DAY]    Zyrtec 10 mg oral tablet [1 tab daily]        Objective:        Exams:     PHYSICAL EXAM:     GENERAL: vital signs recorded - well developed, well nourished;  no apparent distress;     RESPIRATORY: normal appearance and symmetric expansion of chest wall;     NEUROLOGIC: GROSSLY INTACT     PSYCHIATRIC:  appropriate affect and demeanor; normal speech pattern; grossly normal memory;         Assessment:         E78.2   Mixed hyperlipidemia       F41.9   Anxiety disorder, unspecified       I10   Essential (primary) hypertension           ORDERS:         Meds Prescribed:       [Refilled] Lescol XL 80 mg oral Tablet, Extended Release 24 hr [Take 1 tablet(s) by mouth each evening], #90 (ninety) tablets, Refills: 0 (zero)        [Refilled] escitalopram oxalate 20 mg oral tablet [TAKE 1 TABLET BY MOUTH EVERY DAY], #90 (ninety) tablets, Refills: 0 (zero)       [Refilled] lisinopriL 5 mg oral tablet [TAKE 1 TABLET BY MOUTH EVERY DAY], #90 (ninety) tablets, Refills: 0 (zero)         Lab Orders:       FUTURE  Future order to be done at patients convenience  (Send-Out)            [id number]  [facility] CMP AND LIPID: [id number], [id number]  (Send-Out)                      Plan:         Mixed hyperlipidemia    Telehealth: Verbal consent obtained for visit to occur via televideo conferencing     FOLLOW-UP TESTING #1: FOLLOW-UP LABORATORY:  Labs to be scheduled in the future include HTN/Lipid Panel: CMP, Lipid.      RECOMMENDATIONS given include: exercise and low cholesterol/low fat diet.      FOLLOW-UP: fasting for labs, order mailed to patient           Prescriptions:       [Refilled] Lescol XL 80 mg oral Tablet, Extended Release 24 hr [Take 1 tablet(s) by mouth each evening], #90 (ninety) tablets, Refills: 0 (zero)           Orders:       FUTURE  Future order to be done at patients convenience  (Send-Out)            [id number]  [facility] CMP AND LIPID: [id number], [id number]  (Send-Out)              Anxiety disorder, unspecified          Prescriptions:       [Refilled] escitalopram oxalate 20 mg oral tablet [TAKE 1 TABLET BY MOUTH EVERY DAY], #90 (ninety) tablets, Refills: 0 (zero)         Essential (primary) hypertension        RECOMMENDATIONS given include: perform routine monitoring of blood pressure with home blood pressure cuff.            Prescriptions:       [Refilled] lisinopriL 5 mg oral tablet [TAKE 1 TABLET BY MOUTH EVERY DAY], #90 (ninety) tablets, Refills: 0 (zero)             Patient Recommendations:        For  Mixed hyperlipidemia:            The following laboratory testing has been ordered: Maintain a regular exercise program. Reduce the amount of cholesterol and saturated fat in your diet.          For  Essential (primary) hypertension:    Begin  monitoring your blood pressure by brief nurse visits at our office, a home blood pressure monitor, or by checking on the machines in pharmacies or stores.  Keep a log of the readings.              Charge Capture:         Primary Diagnosis:     E78.2  Mixed hyperlipidemia           Orders:      [id number]  Office/outpatient visit; established patient, level 4  (In-House)              F41.9  Anxiety disorder, unspecified     I10  Essential (primary) hypertension

## 2021-05-18 NOTE — PROGRESS NOTES
Madai Ervin  1960     Office/Outpatient Visit    Visit Date: Tue, Jan 12, 2021 10:11 am    Provider: Aleah Garcia N.P. (Assistant: Nan Huston MA)    Location: [facility]        Electronically signed by Aleah Garcia N.P. on  01/12/2021 10:53:03 AM                             Subjective:        CC: Doximity Video [phone] is a 61 year old White female.  This is a follow-up visit.          HPI:           Fabiola presents with mixed hyperlipidemia.  Current treatment includes Lescol.  She denies experiencing any hypercholesterolemia related symptoms.  Most recent lab tests include Glucose, Serum:  101 (mg/dL) (09/11/2019), ALT (SGPT):  19 (U/L) (09/11/2019), Weight (lb):  150.4 (07/01/2020), Systolic BP:  140 (07/01/2020), Diastolic BP:  70 (07/01/2020), Total Cholesterol:  211 (mg/dL) (09/11/2019), HDL:  55 (mg/dL) (09/11/2019), Triglycerides:  120 (mg/dL) (09/11/2019), LDL:  132 (mg/dL) (09/11/2019).            Today's encounter is being done with a telehealth visit. She has consented verbally with two witnesses for todays treatment. Todays visit is being conducted by audio and video. Individuals present during the telemedicine consultation include Madai, pt & JUDSON Zambrano Dx with anxiety disorder, unspecified; apparent triggers include occupational stressors.  Current treatment includes Lexapro.  rx working/work is just busy           Dx with essential (primary) hypertension; her current cardiac medication regimen includes an ACE inhibitor ( Prinivil ).  Fabiola does not check her blood pressure other than at her clinic appointments.  She is tolerating the medication well without side effects.  Compliance with treatment has been good; she takes her medication as directed.      ROS:     CONSTITUTIONAL:  Negative for fever.      CARDIOVASCULAR:  Negative for chest pain, palpitations, tachycardia, orthopnea, and edema.      RESPIRATORY:  Negative for recent  cough and dyspnea.      NEUROLOGICAL:  Negative for dizziness, headaches, paresthesias, and weakness.          Past Medical History / Family History / Social History:         Last Reviewed on 2021 10:42 AM by Aleah Garcia    Past Medical History:         Hyperlipidemia         GYNECOLOGICAL HISTORY:    ; 1 adopted         PREVENTIVE HEALTH MAINTENANCE             BONE DENSITY: was last done 3/8/14 with the following abnormality noted-- osteopenia     COLORECTAL CANCER SCREENING: Up to date (colonoscopy q10y; sigmoidoscopy q5y; Cologuard q3y) was last done 09, Results are in chart; colonoscopy with the following abnormalities noted-- diverticulosis     Hepatitis C Medicare Screening: was last done ; negative     MAMMOGRAM: Done within last 2 years and results in are chart was last done 18 stable         Surgical History:         Hysterectomy: ; Other Surgeries    abdominal surgery/ovarian cyst;    eye surgery ;         Family History:     Father:  at age 30's; Cause of death was accidental death     Mother: Coronary Artery Disease; Hypertension; Hyperlipidemia;  PUD     Brother(s): 4 brother(s) total; 1 ;  Lymphoma     Sister(s): 3 sister(s) total;  Cancer (unspecified type)     Paternal Grandfather:      Paternal Grandmother: ;  Type 2 Diabetes     Maternal Grandfather: Cause of death was heart     Maternal Grandmother:  at age 30's; Cause of death was heart         Social History:     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          Alcohol:  Does not drink alcohol and never has.          Immunizations:     influenza, injectable, quadrivalent, preservative free (FLUZONE QUAD 6929-4840) 2019    Hep A, adult dose 2018    Hep A, adult dose  11/19/2018    Flulaval 9/19/2011    Fluzone (3 + years dose) 11/29/2012    Fluzone pf (3+ years dose) 11/20/2013    Fluzone Quadrivalent (3+ years) 11/19/2018    Influenza A (H1N1) NASAL, Monovalent Live 12/11/2009    Adacel (Tdap) 5/5/2010    influenza, injectable, quadrivalent 10/31/2020        Allergies:     Last Reviewed on 1/12/2021 10:49 AM by Aleah Garcia    No Known Allergies.        Current Medications:     Last Reviewed on 1/12/2021 10:50 AM by Aleah Garcia    fluvastatin 80 mg oral Tablet, Extended Release 24 hr [TAKE 1 TABLET BY MOUTH EVERY EVENING]    aspirin 81 mg oral tablet,chewable [Chew 1 tablet(s) by mouth daily]    Valtrex 1 gram oral tablet [One tablet BID X 2 days PRN]    escitalopram oxalate 20 mg oral tablet [TAKE ONE TABLET BY MOUTH EVERY DAY]    Vitamin D3 25 mcg (1,000 unit) oral tablet [3 tablets daily]    lisinopriL 5 mg oral tablet [TAKE 1 TABLET BY MOUTH EVERY DAY]    Zyrtec 10 mg oral tablet [1 tab daily]    garlic 1,000 mg oral capsule    Vitamin C 1,000 mg oral tablet    zinc 50 mg oral tablet    cyanocobalamin-cobamamide 5,000-100 mcg Sublingual lozenge        Objective:        Exams:     PHYSICAL EXAM:     GENERAL: vital signs recorded - well developed, well nourished;  no apparent distress;     RESPIRATORY: normal appearance and symmetric expansion of chest wall;     NEUROLOGIC: GROSSLY INTACT     PSYCHIATRIC:  appropriate affect and demeanor; normal speech pattern; grossly normal memory;         Assessment:         E78.2   Mixed hyperlipidemia       F41.9   Anxiety disorder, unspecified       I10   Essential (primary) hypertension           ORDERS:         Meds Prescribed:       [Refilled] lisinopriL 5 mg oral tablet [TAKE 1 TABLET BY MOUTH EVERY DAY], #90 (ninety) tablets, Refills: 0 (zero)       [Refilled] escitalopram oxalate 20 mg oral tablet [TAKE ONE TABLET BY MOUTH EVERY DAY], #90 (ninety) tablets, Refills: 0 (zero)       [Refilled] fluvastatin 80 mg oral  Tablet, Extended Release 24 hr [TAKE 1 TABLET BY MOUTH EVERY EVENING], #30 (thirty) tablets, Refills: 0 (zero)                 Plan:         Mixed hyperlipidemiaadvised flu vaccine, to get when she comes in office later this week for labs        RECOMMENDATIONS given include: low cholesterol/low fat diet.      FOLLOW-UP: fasting for labs Telehealth: Verbal consent obtained for visit to occur via televideo conferencing           Prescriptions:       [Refilled] fluvastatin 80 mg oral Tablet, Extended Release 24 hr [TAKE 1 TABLET BY MOUTH EVERY EVENING], #30 (thirty) tablets, Refills: 0 (zero)         Anxiety disorder, unspecified          Prescriptions:       [Refilled] escitalopram oxalate 20 mg oral tablet [TAKE ONE TABLET BY MOUTH EVERY DAY], #90 (ninety) tablets, Refills: 0 (zero)         Essential (primary) hypertensionget bp checked when she comes in office later this week           Prescriptions:       [Refilled] lisinopriL 5 mg oral tablet [TAKE 1 TABLET BY MOUTH EVERY DAY], #90 (ninety) tablets, Refills: 0 (zero)             Patient Recommendations:        For  Mixed hyperlipidemia:    Reduce the amount of cholesterol and saturated fat in your diet.              Charge Capture:         Primary Diagnosis:     E78.2  Mixed hyperlipidemia           Orders:      [id number]  Office/outpatient visit; established patient, level 3  (In-House)              F41.9  Anxiety disorder, unspecified     I10  Essential (primary) hypertension         ADDENDUMS:      ____________________________________    Addendum: 01/14/2021 01:11 PM - Aleah Garcia        Add [id number]; Remove [id number]

## 2021-06-15 DIAGNOSIS — E78.2 MIXED HYPERLIPIDEMIA: ICD-10-CM

## 2021-06-16 RX ORDER — ESCITALOPRAM OXALATE 20 MG/1
20 TABLET ORAL DAILY
Qty: 90 TABLET | Refills: 0 | Status: SHIPPED | OUTPATIENT
Start: 2021-06-16 | End: 2021-08-19 | Stop reason: SDUPTHER

## 2021-06-16 RX ORDER — FLUVASTATIN 40 MG/1
40 CAPSULE ORAL 2 TIMES DAILY
COMMUNITY
End: 2021-06-16 | Stop reason: SDUPTHER

## 2021-06-16 RX ORDER — ESCITALOPRAM OXALATE 20 MG/1
20 TABLET ORAL DAILY
COMMUNITY
End: 2021-06-16 | Stop reason: SDUPTHER

## 2021-06-16 RX ORDER — FLUVASTATIN SODIUM 80 MG/1
80 TABLET, FILM COATED, EXTENDED RELEASE ORAL EVERY EVENING
Qty: 30 TABLET | Refills: 0 | Status: SHIPPED | OUTPATIENT
Start: 2021-06-16 | End: 2021-08-19 | Stop reason: SDUPTHER

## 2021-06-16 NOTE — TELEPHONE ENCOUNTER
LAST OV: 1/12/2021  NEXT OV: none  LAST REFILL: 1/12/2021  LAST LAB: 1/14/21 - lipid panel, 2/9/21 - bmp    PLEASE SIGN OR ADVISE

## 2021-06-25 NOTE — TELEPHONE ENCOUNTER
Pt states she will call back and make an appointment, she is aware that Aleah only sent in one refill.

## 2021-07-01 VITALS
HEART RATE: 72 BPM | TEMPERATURE: 98.2 F | HEIGHT: 63 IN | WEIGHT: 144.3 LBS | SYSTOLIC BLOOD PRESSURE: 138 MMHG | DIASTOLIC BLOOD PRESSURE: 84 MMHG | BODY MASS INDEX: 25.57 KG/M2

## 2021-07-01 VITALS
DIASTOLIC BLOOD PRESSURE: 82 MMHG | SYSTOLIC BLOOD PRESSURE: 144 MMHG | HEIGHT: 63 IN | TEMPERATURE: 98.4 F | WEIGHT: 150.4 LBS | BODY MASS INDEX: 26.65 KG/M2 | HEART RATE: 69 BPM

## 2021-07-01 VITALS
BODY MASS INDEX: 26.82 KG/M2 | HEART RATE: 78 BPM | DIASTOLIC BLOOD PRESSURE: 80 MMHG | HEIGHT: 63 IN | WEIGHT: 151.4 LBS | SYSTOLIC BLOOD PRESSURE: 126 MMHG | TEMPERATURE: 98.7 F

## 2021-07-01 VITALS
BODY MASS INDEX: 26.61 KG/M2 | HEART RATE: 64 BPM | HEIGHT: 63 IN | DIASTOLIC BLOOD PRESSURE: 78 MMHG | SYSTOLIC BLOOD PRESSURE: 133 MMHG | TEMPERATURE: 97.3 F | WEIGHT: 150.2 LBS

## 2021-07-01 VITALS
BODY MASS INDEX: 25.69 KG/M2 | HEIGHT: 63 IN | TEMPERATURE: 96.5 F | SYSTOLIC BLOOD PRESSURE: 142 MMHG | DIASTOLIC BLOOD PRESSURE: 81 MMHG | HEART RATE: 67 BPM | WEIGHT: 145 LBS

## 2021-07-02 VITALS
TEMPERATURE: 97.6 F | SYSTOLIC BLOOD PRESSURE: 140 MMHG | HEIGHT: 63 IN | DIASTOLIC BLOOD PRESSURE: 70 MMHG | WEIGHT: 150.4 LBS | BODY MASS INDEX: 26.65 KG/M2 | HEART RATE: 76 BPM

## 2021-07-23 DIAGNOSIS — E78.2 MIXED HYPERLIPIDEMIA: ICD-10-CM

## 2021-07-27 RX ORDER — FLUVASTATIN SODIUM 80 MG/1
TABLET, FILM COATED, EXTENDED RELEASE ORAL
Qty: 30 TABLET | Refills: 0 | OUTPATIENT
Start: 2021-07-27

## 2021-08-19 ENCOUNTER — TELEMEDICINE (OUTPATIENT)
Dept: FAMILY MEDICINE CLINIC | Age: 61
End: 2021-08-19

## 2021-08-19 DIAGNOSIS — I10 ESSENTIAL HYPERTENSION: ICD-10-CM

## 2021-08-19 DIAGNOSIS — F41.8 OTHER SPECIFIED ANXIETY DISORDERS: Primary | ICD-10-CM

## 2021-08-19 DIAGNOSIS — E78.2 MIXED HYPERLIPIDEMIA: ICD-10-CM

## 2021-08-19 PROCEDURE — 99213 OFFICE O/P EST LOW 20 MIN: CPT | Performed by: NURSE PRACTITIONER

## 2021-08-19 RX ORDER — CETIRIZINE HYDROCHLORIDE 10 MG/1
TABLET ORAL
COMMUNITY

## 2021-08-19 RX ORDER — FLUVASTATIN SODIUM 80 MG/1
80 TABLET, FILM COATED, EXTENDED RELEASE ORAL EVERY EVENING
Qty: 90 TABLET | Refills: 0 | Status: SHIPPED | OUTPATIENT
Start: 2021-08-19 | End: 2022-07-18

## 2021-08-19 RX ORDER — LISINOPRIL 5 MG/1
5 TABLET ORAL DAILY
Qty: 90 TABLET | Refills: 1 | Status: SHIPPED | OUTPATIENT
Start: 2021-08-19 | End: 2021-08-19

## 2021-08-19 RX ORDER — LISINOPRIL 5 MG/1
5 TABLET ORAL DAILY
Qty: 90 TABLET | Refills: 0 | Status: SHIPPED | OUTPATIENT
Start: 2021-08-19 | End: 2022-10-20 | Stop reason: SDUPTHER

## 2021-08-19 RX ORDER — ESCITALOPRAM OXALATE 20 MG/1
20 TABLET ORAL DAILY
Qty: 90 TABLET | Refills: 0 | Status: SHIPPED | OUTPATIENT
Start: 2021-08-19 | End: 2021-12-22

## 2021-08-19 RX ORDER — PROMETHAZINE HYDROCHLORIDE 25 MG/1
TABLET ORAL
COMMUNITY
Start: 2021-07-02 | End: 2022-06-08

## 2021-08-19 RX ORDER — ASPIRIN 81 MG/1
TABLET ORAL
COMMUNITY

## 2021-08-19 RX ORDER — FLUVASTATIN SODIUM 80 MG/1
80 TABLET, FILM COATED, EXTENDED RELEASE ORAL EVERY EVENING
Qty: 90 TABLET | Refills: 1 | Status: SHIPPED | OUTPATIENT
Start: 2021-08-19 | End: 2021-08-19

## 2021-08-19 RX ORDER — LISINOPRIL 5 MG/1
1 TABLET ORAL DAILY
COMMUNITY
End: 2021-08-19 | Stop reason: SDUPTHER

## 2021-08-19 RX ORDER — IBUPROFEN 600 MG/1
600 TABLET ORAL AS NEEDED
COMMUNITY
Start: 2021-07-20

## 2021-08-19 NOTE — ASSESSMENT & PLAN NOTE
Come by office and get fasting labs and bp checked.  Advised to get covid vaccines, she has been considering.   Advised to monitor BP at home. Continue current med. Continue to modify diet and lifestyle. Will need labs every 6 months and follow up.

## 2021-08-19 NOTE — PROGRESS NOTES
Mode of Visit: Video  You have chosen to receive care through a telehealth visit.  Do you consent to use a video/audio connection for your medical care today? Yes   The visit included audio and video interaction. No technical issues occurred during this visit.    Subjective       Chief Complaint   Patient presents with   • Hypertension     Follow up, needs refills on Lisinopril   • Depression     Follow up, needs refills on Lexapro   • Med Refill     Needs refill in [place]   • DOXIMITY     [id number]         HPI:    Madai Ervin is a 61 y.o. female who presents to    [facility] today   Hypertension:  Current medication Lisinopril   Tolerating Medication: YesChecking BP at home and it is : not checking   Needs refills: Yes  Labs   Lab Results   Component Value Date    BUN 16 02/09/2021    CREATININE 1.10 (H) 02/09/2021    BCR 15 02/09/2021    K 4.6 02/09/2021    CO2 25 02/09/2021    CALCIUM 9.6 02/09/2021    ALBUMIN 4.2 01/14/2021    LABIL2 1.4 01/14/2021    AST 20 01/14/2021    ALT 20 01/14/2021     Hyperlipidemia  Current medication Lexcol XL  Tolerating medication: Yes  Needs Refill: Yes    Lab Results   Component Value Date    CHLPL 149 01/14/2021    TRIG 75 01/14/2021    HDL 54 01/14/2021    LDL 80 01/14/2021       Anxiety/ Depression  Current medication/Lexapro  Tolerating medication Yes  Stressors: helps to take care of her mom  Current symptoms Rx working  PMH changes: no surgeries or hospitalizations    covid vaccines: not had yet       Review of Systems   Constitutional: Negative for fever.   Respiratory: Negative for cough and shortness of breath.    Cardiovascular: Negative for chest pain and leg swelling.            PE:   Objective   There were no vitals taken for this visit.    There is no height or weight on file to calculate BMI.    Physical Exam  Constitutional:       General: She is not in acute distress.  Pulmonary:      Effort: No respiratory  distress.   Neurological:      Mental Status: She is alert and oriented to person, place, and time.   Psychiatric:         Mood and Affect: Mood normal.         Behavior: Behavior normal.                             A/P:     Assessment/Plan   Diagnoses and all orders for this visit:    1. Other specified anxiety disorders (Primary)  Assessment & Plan:  Continue lexapro.    Orders:  -     escitalopram (LEXAPRO) 20 MG tablet; Take 1 tablet by mouth Daily for 90 days.  Dispense: 90 tablet; Refill: 0    2. Mixed hyperlipidemia  Assessment & Plan:  Continue current medication and efforts with diet and exercise.       Orders:  -     Discontinue: fluvastatin XL (LESCOL XL) 80 MG 24 hr tablet; Take 1 tablet by mouth Every Evening for 30 days.  Dispense: 90 tablet; Refill: 1  -     Comprehensive Metabolic Panel  -     Lipid Panel  -     fluvastatin XL (LESCOL XL) 80 MG 24 hr tablet; Take 1 tablet by mouth Every Evening for 30 days.  Dispense: 90 tablet; Refill: 0    3. Essential hypertension  Assessment & Plan:  Come by office and get fasting labs and bp checked.  Advised to get covid vaccines, she has been considering.   Advised to monitor BP at home. Continue current med. Continue to modify diet and lifestyle. Will need labs every 6 months and follow up.       Orders:  -     Discontinue: lisinopril (PRINIVIL,ZESTRIL) 5 MG tablet; Take 1 tablet by mouth Daily.  Dispense: 90 tablet; Refill: 1  -     lisinopril (PRINIVIL,ZESTRIL) 5 MG tablet; Take 1 tablet by mouth Daily.  Dispense: 90 tablet; Refill: 0            Follow up:    Return for fasting for labs .

## 2021-09-07 ENCOUNTER — TELEPHONE (OUTPATIENT)
Dept: FAMILY MEDICINE CLINIC | Age: 61
End: 2021-09-07

## 2021-09-07 NOTE — TELEPHONE ENCOUNTER
Pt said she has sneezing, nasal drainage, cough and hot and cold feelings. Offered and appt but she said no she will just stay at home and quarintine. She said it may be just a cold or allergy.  Advised pt if she was to have soa, blue lips, or chest pain she should go to the hospital. She said ok. If her symptoms change she will let us know.

## 2021-09-07 NOTE — TELEPHONE ENCOUNTER
Caller: Madai Ervin    Relationship to patient: Self    Best call back number: [phone]    COVID19 symptoms: CHILLS, FEVER, COUGH, BODY ACHES, FATIGUE    Date of initial quarantine: SELF STARTED 09/04    Additional information or concerns: PATIENT REPORTS HAVING COVID-LIKE SYMPTOMS, AND WOULD LIKE TO KNOW IF SHE SHOULD JUST STAY HOME OR GET TESTED.  PATIENT PREFERS TO JUST QUARANTINE.    PATIENT REQUEST CALL BACK TO DISCUSS OPTIONS.    What is the patients preferred pharmacy:   [address] [facility] - [phone]  - [phone] FX

## 2021-09-10 NOTE — TELEPHONE ENCOUNTER
Pt said she did a home covid test and it was positive. She said she has no energy, has a non productive cough and feels hot and cold at times. She doesn't check her temp. She said her mother's home covid test was positive and her doctor gave her a steriod to take. She wants to know if you would call her in one?  A Garcia is out of the office, sent to on call Dr Be.

## 2021-09-10 NOTE — TELEPHONE ENCOUNTER
Caller: Madai Ervin    Relationship: Self    Best call back number: [phone]    What medication are you requesting: STEROID PACK    What are your current symptoms: COVID POSITIVE    How long have you been experiencing symptoms: WEEK    Have you had these symptoms before:    [] Yes  [x] No    Have you been treated for these symptoms before:   [] Yes  [x] No    If a prescription is needed, what is your preferred pharmacy and phone number: [facility] - [place], [place] - [address] [facility] [phone] [facility] [phone] FX     Additional notes: PATIENT STATED THAT MOTHER IS ALSO COVID POSITIVE AND IS ON STEROIDS. PATIENT STATED THAT SHE IS DOING BETTER SINCE SHE STARTED THEM. PLEASE CALL PATIENT AND ADVISE, THANK YOU

## 2021-09-11 NOTE — TELEPHONE ENCOUNTER
I do not recommend steroids for out-patient treatment of COVID, as they have been shown to have worsened outcomes in patients with mild to moderate symptoms.  However, based on her BMI, she would qualify for monoclonal antibody treatment, depending on when her sx started.  I would recommend a telehealth visit to see if she qualifies and consent her if she wishes to pursue this.  Thanks, CECILIA

## 2021-09-11 NOTE — TELEPHONE ENCOUNTER
Patient still feeling weak, fatigue, loss of appetite, smell and taste, chills and hot flashes, cough, but no SOA, advised she could call or go to ER and try to get set up for an infusion, she and her  are sick, they both have gotten their first covid vaccine recently and will be due their second one; to rest, push fluids, advised their are herbal supplements that can be taken to aid in recovery, her daughter is bringing them supplies, her pharmacist can recommend; contact her 9-13-21 and see how she is doing, Dr. Oviedo had suggested a tele health visit; pt thinks her symptoms started 9-4-21

## 2021-09-13 ENCOUNTER — TELEPHONE (OUTPATIENT)
Dept: FAMILY MEDICINE CLINIC | Age: 61
End: 2021-09-13

## 2021-09-13 NOTE — TELEPHONE ENCOUNTER
Caller: Madai Ervin    Relationship to patient: Self    Best call back number: [phone]    Patient is needing: PATIENT CALLED STATING SHE IS POSITIVE WITH COVID AND SHE STATED SHE HAS LOST HEARING IN HER LEFT EAR AND NOW SHE STATED SHE DOES NOT HEAR WELL WITH HER RIGHT EAR. PATIENT STATED SHE WOULD LIKE A STEROID PACK TO BE SENT TO THE PHARMACY FOR HER DUE TO IT CLEARING HER EAR UP THE LAST TIME THIS HAS HAPPENED. PATIENT WOULD LIKE A CALL BACK TO LET HER KNOW THIS HAS BEEN SENT TO THE PHARMACY. PLEASE ADVISE THANK YOU.         [facility] - [place], [place] - [address] [facility] [address]  - [phone]  - [phone]   [phone]

## 2021-09-13 NOTE — TELEPHONE ENCOUNTER
Pt said she is feeling better. She said her ear is still stopped up. Explained it could be her eustation tube blocked if she has congestion or drainage. She can check with the pharmacy to see which otc would be ok for her to take with high blood pressure.

## 2021-09-22 ENCOUNTER — TELEPHONE (OUTPATIENT)
Dept: FAMILY MEDICINE CLINIC | Age: 61
End: 2021-09-22

## 2021-09-22 DIAGNOSIS — H91.91 HEARING LOSS OF RIGHT EAR, UNSPECIFIED HEARING LOSS TYPE: Primary | ICD-10-CM

## 2021-09-22 RX ORDER — METHYLPREDNISOLONE 4 MG/1
TABLET ORAL
Qty: 1 EACH | Refills: 0 | Status: SHIPPED | OUTPATIENT
Start: 2021-09-22 | End: 2022-06-08

## 2021-09-22 NOTE — TELEPHONE ENCOUNTER
Pt said she is over the covid symptoms now, no fever or anything else.  She is having trouble hearing in her right ear. She said it doesn't feel stopped up or hurting, she just can hear in it. She said once before when she had this problem she got a steroid pack and that took care of it.

## 2021-09-22 NOTE — TELEPHONE ENCOUNTER
Caller: Madai Ervin    Relationship: Self    Best call back number: [phone]    What medication are you requesting: STEROID PACK    What are your current symptoms: HAVING TROUBLE HEARING IN HER RIGHT EAR SINCE SHE HAD COVID    How long have you been experiencing symptoms:  2 WEEKS    Have you had these symptoms before:    [x] Yes  [] No    Have you been treated for these symptoms before:   [x] Yes  [] No    If a prescription is needed, what is your preferred pharmacy and phone number: [facility] - [place], [place] - [address] [facility] [address]  - [phone] [facility] [phone] FX     Additional notes:

## 2021-10-08 ENCOUNTER — TELEPHONE (OUTPATIENT)
Dept: FAMILY MEDICINE CLINIC | Age: 61
End: 2021-10-08

## 2021-10-08 NOTE — TELEPHONE ENCOUNTER
Caller: Madai Ervin    Relationship to patient: Self    Best call back number: [phone]    Patient is needing: PATIENT HAS QUESTIONS ABOUT THE COVID-19 VACCINE SECOND DOSE BECAUSE DURING HER FIRST DOSE SHE GOT EXTREMELY SICK. WOULD LIKE TO SPEAK WITH A NURSE ABOUT IT IF POSSIBLE.

## 2021-12-22 DIAGNOSIS — F41.8 OTHER SPECIFIED ANXIETY DISORDERS: ICD-10-CM

## 2021-12-22 RX ORDER — ESCITALOPRAM OXALATE 20 MG/1
20 TABLET ORAL DAILY
Qty: 90 TABLET | Refills: 0 | Status: SHIPPED | OUTPATIENT
Start: 2021-12-22 | End: 2022-03-29

## 2021-12-22 NOTE — TELEPHONE ENCOUNTER
Rx Refill Note  Requested Prescriptions     Pending Prescriptions Disp Refills   • escitalopram (LEXAPRO) 20 MG tablet [Pharmacy Med Name: escitalopram 20 mg tablet] 90 tablet 0     Sig: TAKE ONE TABLET BY MOUTH EVERY DAY      Last office visit with prescribing clinician: 8/19/21      Next office visit with prescribing clinician: Visit date not found          {TIP  Is Refill Pharmacy correct?Yes  Nan Huston  12/22/21, 10:16 EST

## 2022-02-11 ENCOUNTER — TELEPHONE (OUTPATIENT)
Dept: FAMILY MEDICINE CLINIC | Age: 62
End: 2022-02-11

## 2022-02-11 NOTE — TELEPHONE ENCOUNTER
Caller: Madai Ervin    Relationship: Self    Best call back number: [phone]    What medication are you requesting: ANTIBIOTIC    What are your current symptoms: YELLOWISH NASAL DISCHARGE    How long have you been experiencing symptoms: 2 DAYS, PATIENT PRESENTS WITH THIS EVERY TIME SHE HAS A SINUS INFECTION    Have you had these symptoms before:    [x] Yes  [] No    Have you been treated for these symptoms before:   [x] Yes  [] No    If a prescription is needed, what is your preferred pharmacy and phone number: [facility] - [place], [place] - [address]  - [phone] [facility] [phone] FX     Additional notes:

## 2022-03-29 DIAGNOSIS — F41.8 OTHER SPECIFIED ANXIETY DISORDERS: ICD-10-CM

## 2022-03-29 RX ORDER — ESCITALOPRAM OXALATE 20 MG/1
20 TABLET ORAL DAILY
Qty: 30 TABLET | Refills: 0 | Status: SHIPPED | OUTPATIENT
Start: 2022-03-29 | End: 2022-06-08 | Stop reason: SDUPTHER

## 2022-03-29 RX ORDER — ESCITALOPRAM OXALATE 20 MG/1
TABLET ORAL
Qty: 90 TABLET | Refills: 0 | OUTPATIENT
Start: 2022-03-29

## 2022-05-24 DIAGNOSIS — F41.8 OTHER SPECIFIED ANXIETY DISORDERS: ICD-10-CM

## 2022-05-24 RX ORDER — ESCITALOPRAM OXALATE 20 MG/1
TABLET ORAL
Qty: 30 TABLET | Refills: 1 | OUTPATIENT
Start: 2022-05-24

## 2022-06-08 ENCOUNTER — LAB (OUTPATIENT)
Dept: LAB | Facility: HOSPITAL | Age: 62
End: 2022-06-08

## 2022-06-08 ENCOUNTER — OFFICE VISIT (OUTPATIENT)
Dept: FAMILY MEDICINE CLINIC | Age: 62
End: 2022-06-08

## 2022-06-08 VITALS
SYSTOLIC BLOOD PRESSURE: 143 MMHG | HEART RATE: 71 BPM | DIASTOLIC BLOOD PRESSURE: 84 MMHG | WEIGHT: 149.6 LBS | TEMPERATURE: 98.4 F | BODY MASS INDEX: 26.51 KG/M2 | HEIGHT: 63 IN

## 2022-06-08 DIAGNOSIS — E78.5 HYPERLIPIDEMIA, UNSPECIFIED HYPERLIPIDEMIA TYPE: Primary | ICD-10-CM

## 2022-06-08 DIAGNOSIS — F41.3 OTHER MIXED ANXIETY DISORDERS: ICD-10-CM

## 2022-06-08 DIAGNOSIS — I10 ESSENTIAL HYPERTENSION: ICD-10-CM

## 2022-06-08 LAB
ALBUMIN SERPL-MCNC: 4.4 G/DL (ref 3.5–5.2)
ALBUMIN/GLOB SERPL: 1.6 G/DL
ALP SERPL-CCNC: 68 U/L (ref 39–117)
ALT SERPL W P-5'-P-CCNC: 27 U/L (ref 1–33)
ANION GAP SERPL CALCULATED.3IONS-SCNC: 10 MMOL/L (ref 5–15)
AST SERPL-CCNC: 22 U/L (ref 1–32)
BILIRUB SERPL-MCNC: 0.3 MG/DL (ref 0–1.2)
BUN SERPL-MCNC: 16 MG/DL (ref 8–23)
BUN/CREAT SERPL: 14.8 (ref 7–25)
CALCIUM SPEC-SCNC: 9.6 MG/DL (ref 8.6–10.5)
CHLORIDE SERPL-SCNC: 102 MMOL/L (ref 98–107)
CHOLEST SERPL-MCNC: 256 MG/DL (ref 0–200)
CO2 SERPL-SCNC: 26 MMOL/L (ref 22–29)
CREAT SERPL-MCNC: 1.08 MG/DL (ref 0.57–1)
EGFRCR SERPLBLD CKD-EPI 2021: 57.8 ML/MIN/1.73
GLOBULIN UR ELPH-MCNC: 2.7 GM/DL
GLUCOSE SERPL-MCNC: 98 MG/DL (ref 65–99)
HDLC SERPL-MCNC: 54 MG/DL (ref 40–60)
LDLC SERPL CALC-MCNC: 174 MG/DL (ref 0–100)
LDLC/HDLC SERPL: 3.17 {RATIO}
POTASSIUM SERPL-SCNC: 4.4 MMOL/L (ref 3.5–5.2)
PROT SERPL-MCNC: 7.1 G/DL (ref 6–8.5)
SODIUM SERPL-SCNC: 138 MMOL/L (ref 136–145)
TRIGL SERPL-MCNC: 153 MG/DL (ref 0–150)
VLDLC SERPL-MCNC: 28 MG/DL (ref 5–40)

## 2022-06-08 PROCEDURE — 80061 LIPID PANEL: CPT | Performed by: NURSE PRACTITIONER

## 2022-06-08 PROCEDURE — 36415 COLL VENOUS BLD VENIPUNCTURE: CPT | Performed by: NURSE PRACTITIONER

## 2022-06-08 PROCEDURE — 99213 OFFICE O/P EST LOW 20 MIN: CPT | Performed by: NURSE PRACTITIONER

## 2022-06-08 PROCEDURE — 80053 COMPREHEN METABOLIC PANEL: CPT | Performed by: NURSE PRACTITIONER

## 2022-06-08 RX ORDER — ESCITALOPRAM OXALATE 20 MG/1
20 TABLET ORAL DAILY
Qty: 90 TABLET | Refills: 1 | Status: SHIPPED | OUTPATIENT
Start: 2022-06-08 | End: 2022-12-02

## 2022-06-08 NOTE — ASSESSMENT & PLAN NOTE
Hypertension: to monitor BP at home. Continue current meds. Continue to modify diet and lifestyle.

## 2022-06-08 NOTE — PROGRESS NOTES
Madai Ervin presents to [facility].    Chief Complaint:  Anxiety, Hypertension, and Hyperlipidemia         History of Present Illness:  Hyperlipidemia  Current medication: Lescol   Tolerating medication: Yes  Needs Refill: No    Lab Results       Component                Value               Date                       CHLPL                    149                 01/14/2021                 TRIG                     75                  01/14/2021                 HDL                      54                  01/14/2021                 LDL                      80                  01/14/2021              Hypertension:  Current medication: lisinopril   Tolerating Medication: Yes  Checking BP at home and it is: not checking   Needs refills: she does not think she needs refills    Labs:  Lab Results       Component                Value               Date                       GLUCOSE                  107 (H)             02/09/2021                 BUN                      16                  02/09/2021                 CREATININE               1.10 (H)            02/09/2021                 BCR                      15                  02/09/2021                 K                        4.6                 02/09/2021                 CO2                      25                  02/09/2021                 CALCIUM                  9.6                 02/09/2021                 ALBUMIN                  4.2                 01/14/2021                 LABIL2                   1.4                 01/14/2021                 AST                      20                  01/14/2021                 ALT                      20                  01/14/2021              Anxiety/ Depression  Current medication/lexapro  Tolerating medication Yes  Stressors: lost her mom in 9-2021 to covid after she had covid   Current symptoms: rx helping     Past Medical History:       COVID + 8-2021      Hyperlipidemia          GYNECOLOGICAL HISTORY:    ; 1 adopted         PREVENTIVE HEALTH MAINTENANCE             BONE DENSITY: was last done 3/8/14 with the following abnormality noted-- osteopenia     COLORECTAL CANCER SCREENING: Up to date (colonoscopy q10y; sigmoidoscopy q5y; Cologuard q3y) was last done 09, Results are in chart; colonoscopy with the following abnormalities noted-- diverticulosis     Hepatitis C Medicare Screening: was last done ; negative      Surgical History:         Hysterectomy: ; Other Surgeries    abdominal surgery/ovarian cyst;    eye surgery ;         Family History:     Father:  at age 30's; Cause of death was accidental death     Mother: Coronary Artery Disease; Hypertension; Hyperlipidemia;  PUD,  of covid at 90      Brother(s): 4 brother(s) total; 1 ;  Lymphoma     Sister(s): 3 sister(s) total;  Cancer (unspecified type)     Paternal Grandfather:      Paternal Grandmother: ;  Type 2 Diabetes     Maternal Grandfather: Cause of death was heart     Maternal Grandmother:  at age 30's; Cause of death was heart         Social History:     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   Respiratory: Negative for cough and shortness of breath.    Cardiovascular: Negative for chest pain, palpitations and leg swelling.   Musculoskeletal:        Left hip, knee and ankle pain, has seen Ricky and was referred to PT but not gone    Neurological: Negative for numbness.          Current Outpatient Medications:   •  aspirin 81 MG EC tablet, Aspir-81 81 mg oral tablet,delayed release (DR/EC) take 1 tablet (81 mg) by oral route once daily   Active, Disp: , Rfl:   •  cetirizine (zyrTEC) 10 MG tablet, Zyrtec 10 mg oral tablet take 1 tablet (10 mg) by oral route once daily   Active, Disp: , Rfl:   •  Cholecalciferol 25 MCG  "(1000 UT) capsule, Vitamin D3 25 mcg (1,000 unit) oral capsule take 1 capsule by oral route daily   Active, Disp: , Rfl:   •  escitalopram (LEXAPRO) 20 MG tablet, Take 1 tablet by mouth Daily. DUE FOR OFFICE VISIT, Disp: 90 tablet, Rfl: 1  •  fluvastatin XL (LESCOL XL) 80 MG 24 hr tablet, Take 1 tablet by mouth Every Evening for 30 days., Disp: 90 tablet, Rfl: 0  •  ibuprofen (ADVIL,MOTRIN) 600 MG tablet, Take 600 mg by mouth As Needed. take 1 tablet by mouth every 6 to 8 hours as needed, Disp: , Rfl:   •  lisinopril (PRINIVIL,ZESTRIL) 5 MG tablet, Take 1 tablet by mouth Daily., Disp: 90 tablet, Rfl: 0    Vital Signs:   Vitals:    06/08/22 1044   BP: 143/84   BP Location: Left arm   Patient Position: Sitting   Pulse: 71   Temp: 98.4 °F (36.9 °C)   TempSrc: Oral   Weight: 67.9 kg (149 lb 9.6 oz)   Height: 160 cm (63\")         Physical Exam:  Physical Exam  Vitals reviewed.   Constitutional:       General: She is not in acute distress.     Appearance: Normal appearance.   Neck:      Vascular: No carotid bruit.   Cardiovascular:      Rate and Rhythm: Normal rate and regular rhythm.      Heart sounds: Normal heart sounds. No murmur heard.  Pulmonary:      Effort: Pulmonary effort is normal. No respiratory distress.      Breath sounds: Normal breath sounds.   Musculoskeletal:         General: No swelling.      Right lower leg: No edema.      Left lower leg: No edema.   Neurological:      Mental Status: She is alert.   Psychiatric:         Mood and Affect: Mood normal.         Behavior: Behavior normal.         Result Review      The following data was reviewed by: JUDSON Alcantara on 06/08/2022:    Results for orders placed or performed in visit on 08/12/21    DEXA SCAN   Result Value Ref Range     Dexa Scan SEE REPORT     PAP SMEAR   Result Value Ref Range     Pap smear SEE REPORT                Assessment and Plan:          Diagnoses and all orders for this visit:    1. Hyperlipidemia, unspecified " hyperlipidemia type (Primary)  Assessment & Plan:  Continue current medication and efforts with diet and exercise.       Orders:  -     Comprehensive Metabolic Panel  -     Cancel: Lipid Panel    2. Other mixed anxiety disorders  Assessment & Plan:  Continue lexapro     Orders:  -     escitalopram (LEXAPRO) 20 MG tablet; Take 1 tablet by mouth Daily. DUE FOR OFFICE VISIT  Dispense: 90 tablet; Refill: 1    3. Essential hypertension  Assessment & Plan:  Hypertension: to monitor BP at home. Continue current meds. Continue to modify diet and lifestyle.     Orders:  -     Comprehensive Metabolic Panel  -     Cancel: Lipid Panel        Follow Up   Return for followup pending lab results.  Patient was given instructions and counseling regarding her condition or for health maintenance advice. Please see specific information pulled into the AVS if appropriate.

## 2022-06-13 DIAGNOSIS — E78.5 HYPERLIPIDEMIA, UNSPECIFIED HYPERLIPIDEMIA TYPE: Primary | ICD-10-CM

## 2022-07-18 DIAGNOSIS — E78.2 MIXED HYPERLIPIDEMIA: ICD-10-CM

## 2022-07-18 RX ORDER — FLUVASTATIN SODIUM 80 MG/1
TABLET, FILM COATED, EXTENDED RELEASE ORAL
Qty: 90 TABLET | Refills: 0 | Status: SHIPPED | OUTPATIENT
Start: 2022-07-18 | End: 2022-11-03

## 2022-10-20 DIAGNOSIS — I10 ESSENTIAL HYPERTENSION: ICD-10-CM

## 2022-10-20 RX ORDER — LISINOPRIL 5 MG/1
5 TABLET ORAL DAILY
Qty: 90 TABLET | Refills: 0 | Status: SHIPPED | OUTPATIENT
Start: 2022-10-20

## 2022-10-20 NOTE — TELEPHONE ENCOUNTER
Rx Refill Note  Requested Prescriptions     Pending Prescriptions Disp Refills   • lisinopril (PRINIVIL,ZESTRIL) 5 MG tablet 90 tablet 0     Sig: Take 1 tablet by mouth Daily.      Last office visit with prescribing clinician: 6/8/2022      Next office visit with prescribing clinician: 12/13/2022  Comprehensive Metabolic Panel (06/08/2022 11:17)    Jannette Colunga LPN  10/20/22, 13:07 EDT

## 2022-11-03 DIAGNOSIS — E78.2 MIXED HYPERLIPIDEMIA: ICD-10-CM

## 2022-11-03 RX ORDER — FLUVASTATIN SODIUM 80 MG/1
TABLET, FILM COATED, EXTENDED RELEASE ORAL
Qty: 90 TABLET | Refills: 0 | Status: SHIPPED | OUTPATIENT
Start: 2022-11-03

## 2022-11-03 NOTE — TELEPHONE ENCOUNTER
Rx Refill Note  Requested Prescriptions     Pending Prescriptions Disp Refills   • fluvastatin XL (LESCOL XL) 80 MG 24 hr tablet [Pharmacy Med Name: fluvastatin ER 80 mg tablet,extended release 24 hr] 90 tablet 0     Sig: TAKE ONE TABLET BY MOUTH EVERY EVENING      Last office visit with prescribing clinician: 6/8/2022      Next office visit with prescribing clinician: 12/13/2022       {TIP  Please add Last Relevant Lab Date if appropriate:   Lipid Panel    Lipid Panel 6/8/22   Total Cholesterol 256 (A)   Triglycerides 153 (A)   HDL Cholesterol 54   VLDL Cholesterol 28   LDL Cholesterol  174 (A)   LDL/HDL Ratio 3.17   (A) Abnormal value               {TIP  Is Refill Pharmacy correct? Yes  Nan Huston MA  11/03/22, 11:55 EDT

## 2022-12-02 DIAGNOSIS — F41.3 OTHER MIXED ANXIETY DISORDERS: ICD-10-CM

## 2022-12-02 RX ORDER — ESCITALOPRAM OXALATE 20 MG/1
TABLET ORAL
Qty: 90 TABLET | Refills: 1 | Status: SHIPPED | OUTPATIENT
Start: 2022-12-02

## 2022-12-19 ENCOUNTER — TELEPHONE (OUTPATIENT)
Dept: FAMILY MEDICINE CLINIC | Age: 62
End: 2022-12-19

## 2022-12-27 NOTE — TELEPHONE ENCOUNTER
12/27 spoke to pt re overdue labs ordered on 6/13 by JEREMIE Garcia. Pt states she transferred offices and is no longer a pt here. Sent to provider.

## 2023-01-23 ENCOUNTER — TELEPHONE (OUTPATIENT)
Dept: FAMILY MEDICINE CLINIC | Age: 63
End: 2023-01-23
Payer: COMMERCIAL

## 2023-01-23 DIAGNOSIS — I10 ESSENTIAL HYPERTENSION: ICD-10-CM

## 2023-01-23 RX ORDER — LISINOPRIL 5 MG/1
TABLET ORAL
Qty: 90 TABLET | Refills: 0 | OUTPATIENT
Start: 2023-01-23

## 2023-01-23 NOTE — TELEPHONE ENCOUNTER
Rx Refill Note  Requested Prescriptions     Pending Prescriptions Disp Refills   • lisinopril (PRINIVIL,ZESTRIL) 5 MG tablet [Pharmacy Med Name: lisinopril 5 mg tablet] 90 tablet 0     Sig: TAKE ONE TABLET BY MOUTH EVERY DAY      Last office visit with prescribing clinician: 6/8/2022   Aleah Garcia, APRN   6/13/2022 12:43 PM EDT       Call her and tell her to make sure to take lescol daily and work on diet, drink adequate water, recheck lab in 6 weeks, CMP/ lipid, if still that high will switch to crestor        Next office visit with prescribing clinician: Visit date not found         Giovanna Vincent, CHARLETTE  01/23/23, 09:30 EST

## 2023-02-18 DIAGNOSIS — E78.2 MIXED HYPERLIPIDEMIA: ICD-10-CM

## 2023-02-20 RX ORDER — FLUVASTATIN SODIUM 80 MG/1
TABLET, FILM COATED, EXTENDED RELEASE ORAL
Qty: 90 TABLET | Refills: 0 | OUTPATIENT
Start: 2023-02-20

## 2023-02-20 NOTE — TELEPHONE ENCOUNTER
Rx Refill Note  Requested Prescriptions     Pending Prescriptions Disp Refills   • fluvastatin XL (LESCOL XL) 80 MG 24 hr tablet [Pharmacy Med Name: fluvastatin ER 80 mg tablet,extended release 24 hr] 90 tablet 0     Sig: TAKE ONE TABLET BY MOUTH EVERY EVENING      Last office visit with prescribing clinician: 6/8/2022     Next office visit with prescribing clinician: Visit date not found       Giovanna Vincent LPN  02/20/23, 09:00 EST     Patient has changed PCP to  Ana Bonner.

## 2023-05-26 DIAGNOSIS — F41.3 OTHER MIXED ANXIETY DISORDERS: ICD-10-CM

## 2023-05-26 RX ORDER — ESCITALOPRAM OXALATE 20 MG/1
TABLET ORAL
Qty: 90 TABLET | Refills: 1 | OUTPATIENT
Start: 2023-05-26

## 2023-05-26 NOTE — TELEPHONE ENCOUNTER
Rx Refill Note  Requested Prescriptions     Pending Prescriptions Disp Refills   • escitalopram (LEXAPRO) 20 MG tablet [Pharmacy Med Name: escitalopram 20 mg tablet] 90 tablet 1     Sig: TAKE ONE TABLET BY MOUTH DAILY      Last office visit with prescribing clinician: 6/8/2022   Last telemedicine visit with prescribing clinician: Visit date not found   Next office visit with prescribing clinician: Visit date not found    Denied, needs appt.     Jannette Colunga LPN  05/26/23, 08:56 EDT

## 2023-07-12 PROBLEM — R91.8 LUNG NODULES: Status: ACTIVE | Noted: 2023-07-12

## 2023-07-12 PROBLEM — R06.09 DYSPNEA ON EXERTION: Status: ACTIVE | Noted: 2023-07-12

## 2023-07-25 ENCOUNTER — HOSPITAL ENCOUNTER (OUTPATIENT)
Dept: RESPIRATORY THERAPY | Facility: HOSPITAL | Age: 63
Discharge: HOME | End: 2023-07-25
Admitting: STUDENT IN AN ORGANIZED HEALTH CARE EDUCATION/TRAINING PROGRAM
Payer: COMMERCIAL

## 2023-07-25 DIAGNOSIS — R06.09 DYSPNEA ON EXERTION: ICD-10-CM

## 2023-07-25 PROCEDURE — 94010 BREATHING CAPACITY TEST: CPT

## 2023-07-25 PROCEDURE — 94726 PLETHYSMOGRAPHY LUNG VOLUMES: CPT

## 2023-07-31 ENCOUNTER — TELEPHONE (OUTPATIENT)
Dept: PULMONOLOGY | Facility: CLINIC | Age: 63
End: 2023-07-31

## 2023-07-31 NOTE — TELEPHONE ENCOUNTER
"  Caller: Madai Ervin \"Fabiola\"    Relationship to patient: Self    Best call back number: [phone]    Patient is needing: PT CALLED IN TO CHECK ON THE ECHO PROCEDURE FOR PT HEART. PLEASE CALL BACK AND ADVISE. PT WENT TO [facility] AND THEY DID NOT HAVE ORDERS.         "

## 2023-08-02 NOTE — TELEPHONE ENCOUNTER
According to your last note, you were going to obtain a 2D echo but there were no orders put in for the echo.

## 2023-08-30 ENCOUNTER — HOSPITAL ENCOUNTER (OUTPATIENT)
Dept: CARDIOLOGY | Facility: HOSPITAL | Age: 63
Discharge: HOME | End: 2023-08-30
Admitting: STUDENT IN AN ORGANIZED HEALTH CARE EDUCATION/TRAINING PROGRAM
Payer: COMMERCIAL

## 2023-08-30 DIAGNOSIS — R06.09 DYSPNEA ON EXERTION: ICD-10-CM

## 2023-08-30 PROCEDURE — 93306 TTE W/DOPPLER COMPLETE: CPT

## 2023-08-31 LAB
BH CV ECHO MEAS - AO MEAN PG: 4.1 MMHG
BH CV ECHO MEAS - AO ROOT DIAM: 2.5 CM
BH CV ECHO MEAS - AO V2 VTI: 29.8 CM
BH CV ECHO MEAS - AVA(I,D): 2.9 CM2
BH CV ECHO MEAS - EDV(CUBED): 118.7 ML
BH CV ECHO MEAS - EDV(MOD-SP2): 62.2 ML
BH CV ECHO MEAS - EDV(MOD-SP4): 73.5 ML
BH CV ECHO MEAS - EF(MOD-BP): 62.7 %
BH CV ECHO MEAS - EF(MOD-SP2): 61.9 %
BH CV ECHO MEAS - EF(MOD-SP4): 65.9 %
BH CV ECHO MEAS - ESV(CUBED): 39.5 ML
BH CV ECHO MEAS - ESV(MOD-SP2): 23.7 ML
BH CV ECHO MEAS - ESV(MOD-SP4): 25.1 ML
BH CV ECHO MEAS - FS: 30.7 %
BH CV ECHO MEAS - IVS/LVPW: 0.92 CM
BH CV ECHO MEAS - IVSD: 0.62 CM
BH CV ECHO MEAS - LA DIMENSION: 2.9 CM
BH CV ECHO MEAS - LAT PEAK E' VEL: 9.1 CM/SEC
BH CV ECHO MEAS - LV DIASTOLIC VOL/BSA (35-75): 43 CM2
BH CV ECHO MEAS - LV MASS(C)D: 100.8 GRAMS
BH CV ECHO MEAS - LV MAX PG: 9.4 MMHG
BH CV ECHO MEAS - LV MEAN PG: 4.2 MMHG
BH CV ECHO MEAS - LV SYSTOLIC VOL/BSA (12-30): 14.7 CM2
BH CV ECHO MEAS - LV V1 MAX: 153 CM/SEC
BH CV ECHO MEAS - LV V1 VTI: 28.5 CM
BH CV ECHO MEAS - LVIDD: 4.9 CM
BH CV ECHO MEAS - LVIDS: 3.4 CM
BH CV ECHO MEAS - LVOT AREA: 3 CM2
BH CV ECHO MEAS - LVOT DIAM: 1.97 CM
BH CV ECHO MEAS - LVPWD: 0.67 CM
BH CV ECHO MEAS - MED PEAK E' VEL: 5.4 CM/SEC
BH CV ECHO MEAS - MV A MAX VEL: 86.9 CM/SEC
BH CV ECHO MEAS - MV DEC SLOPE: 366 CM/SEC2
BH CV ECHO MEAS - MV DEC TIME: 0.2 MSEC
BH CV ECHO MEAS - MV E MAX VEL: 73.5 CM/SEC
BH CV ECHO MEAS - MV E/A: 0.85
BH CV ECHO MEAS - RVDD: 2.9 CM
BH CV ECHO MEAS - SI(MOD-SP2): 22.5 ML/M2
BH CV ECHO MEAS - SI(MOD-SP4): 28.3 ML/M2
BH CV ECHO MEAS - SV(LVOT): 86.5 ML
BH CV ECHO MEAS - SV(MOD-SP2): 38.5 ML
BH CV ECHO MEAS - SV(MOD-SP4): 48.4 ML
BH CV ECHO MEAS - TAPSE (>1.6): 2.5 CM
BH CV ECHO MEAS - TR MAX PG: 24.1 MMHG
BH CV ECHO MEAS - TR MAX VEL: 245.7 CM/SEC
BH CV ECHO MEASUREMENTS AVERAGE E/E' RATIO: 10.14
LEFT ATRIUM VOLUME INDEX: 25 ML/M2

## 2023-09-28 ENCOUNTER — TELEPHONE (OUTPATIENT)
Dept: PULMONOLOGY | Facility: CLINIC | Age: 63
End: 2023-09-28

## 2023-09-28 ENCOUNTER — OFFICE VISIT (OUTPATIENT)
Dept: PULMONOLOGY | Facility: CLINIC | Age: 63
End: 2023-09-28
Payer: COMMERCIAL

## 2023-09-28 VITALS
DIASTOLIC BLOOD PRESSURE: 69 MMHG | RESPIRATION RATE: 18 BRPM | HEIGHT: 62 IN | SYSTOLIC BLOOD PRESSURE: 119 MMHG | TEMPERATURE: 98.4 F | OXYGEN SATURATION: 97 % | WEIGHT: 148 LBS | BODY MASS INDEX: 27.23 KG/M2 | HEART RATE: 67 BPM

## 2023-09-28 DIAGNOSIS — R91.8 LUNG NODULES: ICD-10-CM

## 2023-09-28 DIAGNOSIS — R06.09 DYSPNEA ON EXERTION: Primary | ICD-10-CM

## 2023-09-28 RX ORDER — FLUTICASONE PROPIONATE 50 MCG
SPRAY, SUSPENSION (ML) NASAL
COMMUNITY

## 2023-09-28 RX ORDER — MONTELUKAST SODIUM 10 MG/1
10 TABLET ORAL
COMMUNITY

## 2023-09-28 NOTE — TELEPHONE ENCOUNTER
"Caller: Madai Ervin \"Fabiola\"    Relationship to patient: Self    Best call back number: [phone]    Patient is needing: PT WILL BE HAVING GALL BLADDER SURGERY BUT SHE IS UNSURE OF WHEN THAT WILL TAKE PLACE. PT WAS WANDERING IF IT IS A GOOD IDEA TO HAVE THE SURGERY. PT STATED THAT SHE FEELS SICK ALL THE TIME. PLEASE CALL PT BACK AND IF SHE IS NOT REACHED THEN YOU CAN LEAVE HER A MESSAGE.         "

## 2023-09-28 NOTE — PROGRESS NOTES
Primary Care Provider  Ana Bonner PA   Referring Provider  No ref. provider found      Patient Complaint  Follow-up (2 Months) and Shortness of Breath      Subjective          Madai Ervin presents to [facility]      History of Presenting Illness  Madai Ervin is a 63 y.o. female with history of hypertension, GERD here for follow up for dyspnea on exertion.    Patient reports shortness of breath and dyspnea on exertion that has been ongoing for 6 to 8 months.  She really does not have associated symptoms with her shortness of breath.  She denies fevers, chills, chest pain, cough.  She does report some fatigue and just being tired.  She is able to walk around and do her day-to-day activities but feels like she gets shortness of breath very easily.  She is a never smoker.  However she has some secondhand exposure with her mom being a smoker.  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  She has no animal exposure.  She denies history of asthma.  She reports having history of COVID-19 pneumonia 09/2020.  She did not use any inhalers at this time. Patient had a chest CT done 4/22/2023.  Report noted a 4 mm nodule in the right lung base as well as a 3 mm nodule in the right lung base.  I am unable to see this image as it is not in our system. I have personally reviewed patient's past family, social, medical and surgical histories and agree with their findings.    Interval update: Patient is here today with her .  She reports doing okay.  She continues to have some dyspnea on exertion, particularly doing anything with exertion.  She denies cough, sputum, fever, chills, hemoptysis.  Patient had PFTs done which showed isolated reduction in DLCO.  She also had a 2D echo which show EF 62.7%.  No evidence of pulmonary hypertension noted.  Patient reported that she had not started on her Symbicort because she  did not want it to mask her symptoms while doing the studies.  I informed her that she can try Symbicort now to see if it helps with her symptoms.  Patient also reports being out of shape and is working on doing more exercises.    Review of Systems  Constitutional:  No fever. No chills. No weakness.  Eyes: No pain, erythema, or discharge. No blurring of vision.  ENT:  No sore throat, URI symptoms.   Cardiovascular:  No chest pain. No palpitations. No lower extremity edema.  Respiratory:  No shortness of breath, cough, pleuritic chest pain. No hemoptysis. +dyspnea on exertion  GI:  Normal appetite. No nausea, vomiting, diarrhea. No pain. No melena.  Musculoskeletal:  No arthralgias or myalgias.  Neurologic:  No headache. No weakness.    Family History   Problem Relation Age of Onset    Coronary artery disease Other     Heart disease Other     Coronary artery disease Mother     Hypertension Mother     Hyperlipidemia Mother     Ulcers Mother     Cancer Sister         3 SISTER : (UNSPECIFIED WHICH SISTER)    Lymphoma Brother         4 BROTHER 1    NOT SPECIFIED    Heart disease Maternal Grandmother     Heart disease Maternal Grandfather     Diabetes type II Paternal Grandmother         Social History     Socioeconomic History    Marital status:     Number of children: 3   Tobacco Use    Smoking status: Never    Smokeless tobacco: Never   Substance and Sexual Activity    Alcohol use: Never    Drug use: Never        Past Medical History:   Diagnosis Date    Abnormal results of kidney function studies     Acute sinusitis     Anxiety disorder, unspecified     Arm pain     Back pain     Body aches     Essential hypertension     Fatigue     Hearing loss     OTHER SPECIFIED FORMS OF HEARING LOSS, NEC    Heel pain     High risk medications (not anticoagulants) long-term use     Hyperlipidemia     Insect bite     Osteopenia     Pain, unspecified     Tinnitus, right ear         Immunization History   Administered  Date(s) Administered    COVID-19 (PFIZER) Purple Cap Monovalent 08/29/2021    Hep A, 2 Dose 04/25/2018, 11/19/2018    Hepatitis A 04/25/2018, 11/19/2018    Influenza Injectable Mdck Pf Quad 09/28/2020, 11/07/2022    Influenza Quad Vaccine (Inpatient) 11/20/2013    Influenza, Unspecified 11/29/2012, 10/31/2020    Tdap 05/05/2010, 11/21/2016       No Known Allergies       Current Outpatient Medications:     aspirin 81 MG EC tablet, Aspir-81 81 mg oral tablet,delayed release (DR/EC) take 1 tablet (81 mg) by oral route once daily   Active, Disp: , Rfl:     cetirizine (zyrTEC) 10 MG tablet, Zyrtec 10 mg oral tablet take 1 tablet (10 mg) by oral route once daily   Active, Disp: , Rfl:     Cholecalciferol 25 MCG (1000 UT) capsule, Vitamin D3 25 mcg (1,000 unit) oral capsule take 1 capsule by oral route daily   Active, Disp: , Rfl:     cyanocobalamin (VITAMIN B-12) 50 MCG tablet, Take 10 tablets by mouth Daily., Disp: , Rfl:     escitalopram (LEXAPRO) 20 MG tablet, TAKE ONE TABLET BY MOUTH DAILY, Disp: 90 tablet, Rfl: 1    FeroSul 325 (65 Fe) MG tablet, Take 1 tablet by mouth Daily With Breakfast., Disp: , Rfl:     fluticasone (FLONASE) 50 MCG/ACT nasal spray, INSTILL ONE SPRAY INTO EACH NOSTRIL EVERY DAY, Disp: , Rfl:     fluvastatin XL (LESCOL XL) 80 MG 24 hr tablet, TAKE ONE TABLET BY MOUTH EVERY EVENING, Disp: 90 tablet, Rfl: 0    folic acid (FOLVITE) 1 MG tablet, Take 1 tablet by mouth Daily., Disp: 30 tablet, Rfl: 5    GARLIC PO, Take  by mouth Daily., Disp: , Rfl:     levothyroxine (SYNTHROID, LEVOTHROID) 25 MCG tablet, Take 1 tablet by mouth., Disp: , Rfl:     lisinopril (PRINIVIL,ZESTRIL) 5 MG tablet, Take 1 tablet by mouth Daily., Disp: 90 tablet, Rfl: 0    montelukast (SINGULAIR) 10 MG tablet, Take 1 tablet by mouth every night at bedtime., Disp: , Rfl:     albuterol sulfate  (90 Base) MCG/ACT inhaler, Inhale 2 puffs Every 4 (Four) Hours As Needed for Wheezing or Shortness of Air. (Patient not taking:  "Reported on 9/28/2023), Disp: 1 g, Rfl: 5    budesonide-formoterol (SYMBICORT) 160-4.5 MCG/ACT inhaler, Inhale 2 puffs 2 (Two) Times a Day. (Patient not taking: Reported on 9/28/2023), Disp: 1 each, Rfl: 4    ibuprofen (ADVIL,MOTRIN) 600 MG tablet, Take 600 mg by mouth As Needed. take 1 tablet by mouth every 6 to 8 hours as needed (Patient not taking: Reported on 7/12/2023), Disp: , Rfl:     pantoprazole (PROTONIX) 20 MG EC tablet, Take 1 tablet by mouth Every Morning. (Patient not taking: Reported on 9/28/2023), Disp: , Rfl:      Objective     Vital Signs:   /69 (BP Location: Left arm, Patient Position: Sitting, Cuff Size: Adult)   Pulse 67   Temp 98.4 °F (36.9 °C) (Temporal)   Resp 18   Ht 157.5 cm (62\")   Wt 67.1 kg (148 lb)   SpO2 97% Comment: Room air  BMI 27.07 kg/m²     Physical Exam  Vitals:    09/28/23 1311   BP: 119/69   Pulse: 67   Resp: 18   Temp: 98.4 °F (36.9 °C)   SpO2: 97%       General: Alert, NAD  HEENT:  EOMI, no sinus tenderness  Neck:  Supple, no thyromegaly,  no JVD  Lymph: no cervical, supraclavicular lymphadenopathy bilaterally  Chest:  clear to auscultation bilaterally, no wheezing or crackles; no work of breathing noted  CV: RRR, no M/G/R, pulses 2+, equal.  Abd:  Soft, NT, ND, +BS, overweight  EXT:  no clubbing, no cyanosis, no edema b/l  Neuro:  A&Ox3, CN grossly intact, no focal deficits  Skin: No rashes or lesions noted       Result Review :   I have personally reviewed patient's labs and images.          Assessment and Plan      Patient Active Problem List   Diagnosis    Hyperlipidemia    Anxiety disorder, unspecified    Essential hypertension    Dyspnea on exertion    Lung nodules       Impression and Plan:    Dyspnea on exertion  Lung nodules  Never smoker    -chest CT done 4/22/2023.  Report noted a 4 mm nodule in the right lung base as well as a 3 mm nodule in the right lung base.  I am unable to view this image.  Will need repeat chest CT in 1 year  -PFTs done " 7/25/2023 show isolated mild to moderate reduction in DLCO.  This may suggest pulmonary vascular disease, anemia, or early emphysema.  -2D echo show EF 62.7%.  Trace tricuspid valve regurgitation with E RVSP less than 35 mmHg.  No evidence of PH  -I am unable to see any CBC levels to check if patient has anemia.  Informed patient to check with her PCP about her hemoglobin  -Trial Symbicort twice daily with albuterol as needed  -Follow-up in 3 to 4 months or earlier as needed    Vaccination status: Patient is up-to-date with her COVID vaccination and influenza vaccination.  She did refused pneumonia vaccination at this time    Medications personally reviewed.    Follow Up   No follow-ups on file.  Patient was given instructions and counseling regarding her condition or for health maintenance advice. Please see specific information pulled into the AVS if appropriate.

## 2023-10-12 DIAGNOSIS — R06.09 DYSPNEA ON EXERTION: ICD-10-CM

## 2023-10-12 RX ORDER — ALBUTEROL SULFATE 90 UG/1
2 AEROSOL, METERED RESPIRATORY (INHALATION) EVERY 4 HOURS PRN
Qty: 1 G | Refills: 5 | Status: SHIPPED | OUTPATIENT
Start: 2023-10-12

## 2023-10-18 ENCOUNTER — TELEPHONE (OUTPATIENT)
Dept: PULMONOLOGY | Facility: CLINIC | Age: 63
End: 2023-10-18
Payer: COMMERCIAL

## 2023-10-23 ENCOUNTER — TRANSCRIBE ORDERS (OUTPATIENT)
Dept: ADMINISTRATIVE | Facility: HOSPITAL | Age: 63
End: 2023-10-23
Payer: COMMERCIAL

## 2023-10-23 DIAGNOSIS — Z12.31 ENCOUNTER FOR SCREENING MAMMOGRAM FOR MALIGNANT NEOPLASM OF BREAST: Primary | ICD-10-CM

## 2023-10-26 ENCOUNTER — TELEPHONE (OUTPATIENT)
Dept: PULMONOLOGY | Facility: CLINIC | Age: 63
End: 2023-10-26
Payer: COMMERCIAL

## 2023-11-16 ENCOUNTER — HOSPITAL ENCOUNTER (OUTPATIENT)
Dept: MAMMOGRAPHY | Facility: HOSPITAL | Age: 63
Discharge: HOME | End: 2023-11-16
Admitting: PHYSICIAN ASSISTANT
Payer: COMMERCIAL

## 2023-11-16 DIAGNOSIS — Z12.31 ENCOUNTER FOR SCREENING MAMMOGRAM FOR MALIGNANT NEOPLASM OF BREAST: ICD-10-CM

## 2023-11-16 PROCEDURE — 77063 BREAST TOMOSYNTHESIS BI: CPT

## 2023-11-16 PROCEDURE — 77067 SCR MAMMO BI INCL CAD: CPT

## 2024-01-26 ENCOUNTER — TELEPHONE (OUTPATIENT)
Dept: PULMONOLOGY | Facility: CLINIC | Age: 64
End: 2024-01-26

## 2024-01-26 NOTE — TELEPHONE ENCOUNTER
Called and spoke with patient to make sure patient was not needing any refills. Patient does not need refills at this time. Pharmacy switched to [facility] in [place].

## 2024-01-26 NOTE — TELEPHONE ENCOUNTER
"  Hub staff attempted to follow warm transfer process and was unsuccessful     Caller: Madai Ervin \"Fabiola\"    Relationship to patient: Self    Best call back number:     [phone]       Patient is needing: PT HAS SWITCHED TO A DIFFERENT PHARMACY. PLEASE SEND MEDICATION TO CURRENT PHARMACY ON FILE.     PHARMACY:  [facility] - [place], [place] - [address] [phone] [facility] [phone]  [address] [facility] [address] Phone: [phone] Fax: [phone] Hours: Not open 24 hours         "

## 2024-02-22 ENCOUNTER — OFFICE VISIT (OUTPATIENT)
Dept: PULMONOLOGY | Facility: CLINIC | Age: 64
End: 2024-02-22
Payer: COMMERCIAL

## 2024-02-22 VITALS
HEIGHT: 62 IN | BODY MASS INDEX: 29.44 KG/M2 | SYSTOLIC BLOOD PRESSURE: 125 MMHG | HEART RATE: 83 BPM | WEIGHT: 160 LBS | OXYGEN SATURATION: 97 % | RESPIRATION RATE: 18 BRPM | DIASTOLIC BLOOD PRESSURE: 66 MMHG

## 2024-02-22 DIAGNOSIS — R06.09 DYSPNEA ON EXERTION: ICD-10-CM

## 2024-02-22 DIAGNOSIS — R91.8 LUNG NODULES: Primary | ICD-10-CM

## 2024-02-22 DIAGNOSIS — R29.818 SUSPECTED SLEEP APNEA: ICD-10-CM

## 2024-02-22 RX ORDER — FOLIC ACID 1 MG/1
1000 TABLET ORAL EVERY MORNING
COMMUNITY

## 2024-02-22 NOTE — PROGRESS NOTES
Primary Care Provider  Ana Bonner PA   Referring Provider  No ref. provider found      Patient Complaint  Follow-up (3 Months)      Subjective          Madai Ervin presents to [facility] PULMONARY & CRITICAL CARE MEDICINE      History of Presenting Illness  Madai Ervin is a 64 y.o. female with history of hypertension, GERD here for follow up for dyspnea on exertion.    Interval update: Patient with proximately here today.  She reports feeling more or less the same.  She is able to do her day-to-day activities but has some dyspnea on exertion particular when she exerts herself with movements.  She said ever since COVID in the fall  she has never felt the same.  Her  also had COVID and is still having issues with smell.  She used Symbicort without much improvement in her symptoms.  She has albuterol which she uses as needed at times.  She also reports feeling fatigue and tired during the day despite numerous hours of sleep.  Her  says she snores.  She denies fevers, chills, hemoptysis.    Review of Systems  Constitutional:  No fever. No chills. No weakness.  Eyes: No pain, erythema, or discharge. No blurring of vision.  ENT:  No sore throat, URI symptoms.   Cardiovascular:  No chest pain. No palpitations. No lower extremity edema.  Respiratory:  No shortness of breath, cough, pleuritic chest pain. No hemoptysis. +dyspnea on exertion  GI:  Normal appetite. No nausea, vomiting, diarrhea. No pain. No melena.  Musculoskeletal:  No arthralgias or myalgias.  Neurologic:  No headache. No weakness.    Family History   Problem Relation Age of Onset    Coronary artery disease Other     Heart disease Other     Coronary artery disease Mother     Hypertension Mother     Hyperlipidemia Mother     Ulcers Mother     Cancer Sister         3 SISTER : (UNSPECIFIED WHICH SISTER)    Lymphoma Brother         4 BROTHER 1    NOT SPECIFIED    Heart disease Maternal  Grandmother     Heart disease Maternal Grandfather     Diabetes type II Paternal Grandmother         Social History     Socioeconomic History    Marital status:     Number of children: 3   Tobacco Use    Smoking status: Never    Smokeless tobacco: Never   Vaping Use    Vaping Use: Never used   Substance and Sexual Activity    Alcohol use: Never    Drug use: Never        Past Medical History:   Diagnosis Date    Abnormal results of kidney function studies     Acute sinusitis     Anxiety disorder, unspecified     Arm pain     Back pain     Body aches     Essential hypertension     Fatigue     Hearing loss     OTHER SPECIFIED FORMS OF HEARING LOSS, NEC    Heel pain     High risk medications (not anticoagulants) long-term use     Hyperlipidemia     Insect bite     Osteopenia     Pain, unspecified     Tinnitus, right ear         Immunization History   Administered Date(s) Administered    COVID-19 (PFIZER) Purple Cap Monovalent 08/29/2021    Hep A, 2 Dose 04/25/2018, 11/19/2018    Hepatitis A 04/25/2018, 11/19/2018    Influenza Injectable Mdck Pf Quad 09/28/2020, 11/07/2022    Influenza Quad Vaccine (Inpatient) 11/20/2013    Influenza, Unspecified 11/29/2012, 10/31/2020    Tdap 05/05/2010, 11/21/2016       No Known Allergies       Current Outpatient Medications:     albuterol sulfate  (90 Base) MCG/ACT inhaler, Inhale 2 puffs Every 4 (Four) Hours As Needed for Wheezing or Shortness of Air., Disp: 1 g, Rfl: 5    aspirin 81 MG EC tablet, Aspir-81 81 mg oral tablet,delayed release (DR/EC) take 1 tablet (81 mg) by oral route once daily   Active, Disp: , Rfl:     budesonide-formoterol (SYMBICORT) 160-4.5 MCG/ACT inhaler, Inhale 2 puffs 2 (Two) Times a Day. (Patient not taking: Reported on 9/28/2023), Disp: 1 each, Rfl: 4    cetirizine (zyrTEC) 10 MG tablet, Zyrtec 10 mg oral tablet take 1 tablet (10 mg) by oral route once daily   Active, Disp: , Rfl:     Cholecalciferol 25 MCG (1000 UT) capsule, Vitamin D3 25 mcg  (1,000 unit) oral capsule take 1 capsule by oral route daily   Active, Disp: , Rfl:     cyanocobalamin (VITAMIN B-12) 50 MCG tablet, Take 10 tablets by mouth Daily., Disp: , Rfl:     escitalopram (LEXAPRO) 20 MG tablet, TAKE ONE TABLET BY MOUTH DAILY, Disp: 90 tablet, Rfl: 1    FeroSul 325 (65 Fe) MG tablet, Take 1 tablet by mouth Daily With Breakfast., Disp: , Rfl:     fluticasone (FLONASE) 50 MCG/ACT nasal spray, INSTILL ONE SPRAY INTO EACH NOSTRIL EVERY DAY, Disp: , Rfl:     fluvastatin XL (LESCOL XL) 80 MG 24 hr tablet, TAKE ONE TABLET BY MOUTH EVERY EVENING, Disp: 90 tablet, Rfl: 0    GARLIC PO, Take  by mouth Daily., Disp: , Rfl:     ibuprofen (ADVIL,MOTRIN) 600 MG tablet, Take 600 mg by mouth As Needed. take 1 tablet by mouth every 6 to 8 hours as needed (Patient not taking: Reported on 7/12/2023), Disp: , Rfl:     levothyroxine (SYNTHROID, LEVOTHROID) 25 MCG tablet, Take 1 tablet by mouth., Disp: , Rfl:     lisinopril (PRINIVIL,ZESTRIL) 5 MG tablet, Take 1 tablet by mouth Daily., Disp: 90 tablet, Rfl: 0    montelukast (SINGULAIR) 10 MG tablet, Take 1 tablet by mouth every night at bedtime., Disp: , Rfl:     pantoprazole (PROTONIX) 20 MG EC tablet, Take 1 tablet by mouth Every Morning. (Patient not taking: Reported on 9/28/2023), Disp: , Rfl:      Objective     Vital Signs:   There were no vitals taken for this visit.    Physical Exam  There were no vitals filed for this visit.      General: Alert, NAD  HEENT:  EOMI, no sinus tenderness  Neck:  Supple, no thyromegaly,  no JVD  Lymph: no cervical, supraclavicular lymphadenopathy bilaterally  Chest:  clear to auscultation bilaterally, no wheezing or crackles; no work of breathing noted  CV: RRR, no M/G/R, pulses 2+, equal.  Abd:  Soft, NT, ND, +BS, overweight  EXT:  no clubbing, no cyanosis, no edema b/l  Neuro:  A&Ox3, CN grossly intact, no focal deficits  Skin: No rashes or lesions noted       Result Review :   I have personally reviewed patient's labs and  images.          Assessment and Plan      Patient Active Problem List   Diagnosis    Hyperlipidemia    Anxiety disorder, unspecified    Essential hypertension    Dyspnea on exertion    Lung nodules       Impression and Plan:    Chronic dyspnea on exertion  Lung nodules  Never smoker  Suspected sleep apnea    -chest CT done 4/22/2023.  Report noted a 4 mm nodule in the right lung base as well as a 3 mm nodule in the right lung base.  I am unable to view this image.  Will need repeat chest CT in 1 year; will order for 04/2024  -PFTs done 7/25/2023 show isolated mild to moderate reduction in DLCO.  This may suggest pulmonary vascular disease, anemia, or early emphysema.  -2D echo show EF 62.7%.  Trace tricuspid valve regurgitation with E RVSP less than 35 mmHg.  No evidence of PH  -Continue as needed albuterol.  Patient did not felt improvement with Symbicort  -Will order home sleep study as she likely has sleep apnea  -Follow-up in 3 months after sleep study and repeat CT    Vaccination status: Patient is up-to-date with her COVID vaccination and influenza vaccination.  She did refused pneumonia vaccination at this time    Medications personally reviewed.    Follow Up   No follow-ups on file.  Patient was given instructions and counseling regarding her condition or for health maintenance advice. Please see specific information pulled into the AVS if appropriate.

## 2024-03-20 ENCOUNTER — HOSPITAL ENCOUNTER (OUTPATIENT)
Dept: SLEEP MEDICINE | Facility: HOSPITAL | Age: 64
Discharge: HOME | End: 2024-03-20
Admitting: STUDENT IN AN ORGANIZED HEALTH CARE EDUCATION/TRAINING PROGRAM
Payer: MEDICARE

## 2024-03-20 DIAGNOSIS — R29.818 SUSPECTED SLEEP APNEA: ICD-10-CM

## 2024-03-20 PROCEDURE — 95806 SLEEP STUDY UNATT&RESP EFFT: CPT

## 2024-03-25 ENCOUNTER — TELEPHONE (OUTPATIENT)
Dept: PULMONOLOGY | Facility: CLINIC | Age: 64
End: 2024-03-25
Payer: MEDICARE

## 2024-03-25 DIAGNOSIS — G47.33 OSA (OBSTRUCTIVE SLEEP APNEA): Primary | ICD-10-CM

## 2024-03-25 NOTE — TELEPHONE ENCOUNTER
"Mary with sleep called and asked if you can cancel the original in home sleep study as the DX of R29.818 was not covered by medicare.  Will you please re order the in home with the DX of G47.33.  Medicare knows this is a code for \"with expected\" Loyd.  If they use the old order pt will have a very large bill.    "

## 2024-03-27 PROCEDURE — 95806 SLEEP STUDY UNATT&RESP EFFT: CPT | Performed by: INTERNAL MEDICINE

## 2024-03-28 DIAGNOSIS — G47.31 COMPLEX SLEEP APNEA SYNDROME: Primary | ICD-10-CM

## 2024-04-02 ENCOUNTER — TELEPHONE (OUTPATIENT)
Dept: PULMONOLOGY | Facility: CLINIC | Age: 64
End: 2024-04-02

## 2024-04-02 NOTE — TELEPHONE ENCOUNTER
"Caller: Madai Ervin \"Fabiola\"      Relationship: SELF     Best call back number: [phone]      Who are you requesting to speak with (clinical staff, provider,  specific staff member): JASS     What was the call regarding: REQ A CALL BACK REGARDING RESULTS  "

## 2024-04-02 NOTE — TELEPHONE ENCOUNTER
Spoke with the patient and she is requesting the results for her sleep study, I informed her that we may not have the results that we can give her but that I would sent a message just incase.

## 2024-04-02 NOTE — TELEPHONE ENCOUNTER
Results of patient's sleep study are not yet in the chart, we will let her know once they are available, thank you.

## 2024-04-04 NOTE — TELEPHONE ENCOUNTER
Spoke with the patient and informed them via Nurse Alford that the patient had a low O2 level for the majority of the night at below 89% and the lowest that the patient had dropped was at 82%.

## 2024-04-22 ENCOUNTER — HOSPITAL ENCOUNTER (OUTPATIENT)
Dept: CT IMAGING | Facility: HOSPITAL | Age: 64
Discharge: HOME | End: 2024-04-22
Admitting: STUDENT IN AN ORGANIZED HEALTH CARE EDUCATION/TRAINING PROGRAM
Payer: MEDICARE

## 2024-04-22 DIAGNOSIS — R06.09 DYSPNEA ON EXERTION: ICD-10-CM

## 2024-04-22 DIAGNOSIS — R91.8 LUNG NODULES: ICD-10-CM

## 2024-04-22 PROCEDURE — 71250 CT THORAX DX C-: CPT

## 2024-07-10 ENCOUNTER — OFFICE VISIT (OUTPATIENT)
Dept: PULMONOLOGY | Facility: CLINIC | Age: 64
End: 2024-07-10
Payer: MEDICARE

## 2024-07-10 VITALS
SYSTOLIC BLOOD PRESSURE: 122 MMHG | HEART RATE: 68 BPM | TEMPERATURE: 97.8 F | HEIGHT: 62 IN | DIASTOLIC BLOOD PRESSURE: 71 MMHG | OXYGEN SATURATION: 97 % | RESPIRATION RATE: 18 BRPM | BODY MASS INDEX: 28.34 KG/M2 | WEIGHT: 154 LBS

## 2024-07-10 DIAGNOSIS — G47.34 NOCTURNAL HYPOXIA: ICD-10-CM

## 2024-07-10 DIAGNOSIS — R06.09 DYSPNEA ON EXERTION: Primary | ICD-10-CM

## 2024-07-10 DIAGNOSIS — R91.8 LUNG NODULES: ICD-10-CM

## 2024-07-10 PROCEDURE — 3074F SYST BP LT 130 MM HG: CPT | Performed by: STUDENT IN AN ORGANIZED HEALTH CARE EDUCATION/TRAINING PROGRAM

## 2024-07-10 PROCEDURE — 99214 OFFICE O/P EST MOD 30 MIN: CPT | Performed by: STUDENT IN AN ORGANIZED HEALTH CARE EDUCATION/TRAINING PROGRAM

## 2024-07-10 PROCEDURE — 3078F DIAST BP <80 MM HG: CPT | Performed by: STUDENT IN AN ORGANIZED HEALTH CARE EDUCATION/TRAINING PROGRAM

## 2024-07-10 PROCEDURE — 1160F RVW MEDS BY RX/DR IN RCRD: CPT | Performed by: STUDENT IN AN ORGANIZED HEALTH CARE EDUCATION/TRAINING PROGRAM

## 2024-07-10 PROCEDURE — 1159F MED LIST DOCD IN RCRD: CPT | Performed by: STUDENT IN AN ORGANIZED HEALTH CARE EDUCATION/TRAINING PROGRAM

## 2024-07-10 NOTE — PROGRESS NOTES
Primary Care Provider  Ana Bonner PA   Referring Provider  No ref. provider found      Patient Complaint  Follow-up (1 Month)      Subjective          Madai Ervin presents to [facility] PULMONARY & CRITICAL CARE MEDICINE      History of Presenting Illness  Madai Ervin is a 64 y.o. female with history of hypertension, GERD here for follow up for dyspnea on exertion.    Interval update: Patient is doing well today.  She reports chronic dyspnea on exertion which she has gotten used to.  It does not interfere with her day-to-day activities.  She has tried albuterol and Symbicort without significant improvement.  She is willing to try triple therapy inhaler to see if it helps with her symptoms.  Patient had a sleep study done which showed AHI of 7.  However she did desat to 88% for more than 60 minutes.  She was recommended a in-house sleep study but she did not want to do it at this time.  She will reconsider.  She remains a never smoker.  She denies fever, chills, cough, sputum production, hemoptysis.    Review of Systems  Constitutional:  No fever. No chills. No weakness.  Eyes: No pain, erythema, or discharge. No blurring of vision.  ENT:  No sore throat, URI symptoms.   Cardiovascular:  No chest pain. No palpitations. No lower extremity edema.  Respiratory:  No shortness of breath, cough, pleuritic chest pain. No hemoptysis. +chronic dyspnea on exertion  GI:  Normal appetite. No nausea, vomiting, diarrhea. No pain. No melena.  Musculoskeletal:  No arthralgias or myalgias.  Neurologic:  No headache. No weakness.    Family History   Problem Relation Age of Onset    Coronary artery disease Other     Heart disease Other     Coronary artery disease Mother     Hypertension Mother     Hyperlipidemia Mother     Ulcers Mother     Cancer Sister         3 SISTER : (UNSPECIFIED WHICH SISTER)    Lymphoma Brother         4 BROTHER 1    NOT SPECIFIED    Heart disease Maternal  Grandmother     Heart disease Maternal Grandfather     Diabetes type II Paternal Grandmother         Social History     Socioeconomic History    Marital status:     Number of children: 3   Tobacco Use    Smoking status: Never    Smokeless tobacco: Never   Vaping Use    Vaping status: Never Used   Substance and Sexual Activity    Alcohol use: Never    Drug use: Never        Past Medical History:   Diagnosis Date    Abnormal results of kidney function studies     Acute sinusitis     Anxiety disorder, unspecified     Arm pain     Back pain     Body aches     Essential hypertension     Fatigue     Hearing loss     OTHER SPECIFIED FORMS OF HEARING LOSS, NEC    Heel pain     High risk medications (not anticoagulants) long-term use     Hyperlipidemia     Insect bite     Osteopenia     Pain, unspecified     Tinnitus, right ear         Immunization History   Administered Date(s) Administered    COVID-19 (PFIZER) Purple Cap Monovalent 08/29/2021    Hep A, 2 Dose 04/25/2018, 11/19/2018    Hepatitis A 04/25/2018, 11/19/2018    Influenza Injectable Mdck Pf Quad 09/28/2020, 11/07/2022    Influenza Quad Vaccine (Inpatient) 11/20/2013    Influenza, Unspecified 11/29/2012, 10/31/2020    Tdap 05/05/2010, 11/21/2016       No Known Allergies       Current Outpatient Medications:     albuterol sulfate  (90 Base) MCG/ACT inhaler, Inhale 2 puffs Every 4 (Four) Hours As Needed for Wheezing or Shortness of Air., Disp: 1 g, Rfl: 5    aspirin 81 MG EC tablet, Aspir-81 81 mg oral tablet,delayed release (DR/EC) take 1 tablet (81 mg) by oral route once daily   Active, Disp: , Rfl:     cetirizine (zyrTEC) 10 MG tablet, Zyrtec 10 mg oral tablet take 1 tablet (10 mg) by oral route once daily   Active, Disp: , Rfl:     Cholecalciferol 25 MCG (1000 UT) capsule, Vitamin D3 25 mcg (1,000 unit) oral capsule take 1 capsule by oral route daily   Active, Disp: , Rfl:     cyanocobalamin (VITAMIN B-12) 50 MCG tablet, Take 10 tablets by mouth  "Daily., Disp: , Rfl:     escitalopram (LEXAPRO) 20 MG tablet, TAKE ONE TABLET BY MOUTH DAILY, Disp: 90 tablet, Rfl: 1    FeroSul 325 (65 Fe) MG tablet, Take 1 tablet by mouth Daily With Breakfast., Disp: , Rfl:     fluticasone (FLONASE) 50 MCG/ACT nasal spray, INSTILL ONE SPRAY INTO EACH NOSTRIL EVERY DAY, Disp: , Rfl:     fluvastatin XL (LESCOL XL) 80 MG 24 hr tablet, TAKE ONE TABLET BY MOUTH EVERY EVENING, Disp: 90 tablet, Rfl: 0    folic acid (FOLVITE) 1 MG tablet, Take 1 tablet by mouth Every Morning., Disp: , Rfl:     GARLIC PO, Take  by mouth Daily., Disp: , Rfl:     lisinopril (PRINIVIL,ZESTRIL) 5 MG tablet, Take 1 tablet by mouth Daily., Disp: 90 tablet, Rfl: 0    budesonide-formoterol (SYMBICORT) 160-4.5 MCG/ACT inhaler, Inhale 2 puffs 2 (Two) Times a Day. (Patient not taking: Reported on 9/28/2023), Disp: 1 each, Rfl: 4    ibuprofen (ADVIL,MOTRIN) 600 MG tablet, Take 600 mg by mouth As Needed. take 1 tablet by mouth every 6 to 8 hours as needed (Patient not taking: Reported on 7/12/2023), Disp: , Rfl:     levothyroxine (SYNTHROID, LEVOTHROID) 25 MCG tablet, Take 1 tablet by mouth., Disp: , Rfl:     montelukast (SINGULAIR) 10 MG tablet, Take 1 tablet by mouth every night at bedtime. (Patient not taking: Reported on 7/10/2024), Disp: , Rfl:     pantoprazole (PROTONIX) 20 MG EC tablet, Take 1 tablet by mouth Every Morning. (Patient not taking: Reported on 9/28/2023), Disp: , Rfl:      Objective     Vital Signs:   /71 (BP Location: Left arm, Patient Position: Sitting, Cuff Size: Adult)   Pulse 68   Temp 97.8 °F (36.6 °C) (Temporal)   Resp 18   Ht 157.5 cm (62\")   Wt 69.9 kg (154 lb)   SpO2 97% Comment: Room air  BMI 28.17 kg/m²     Physical Exam  Vitals:    07/10/24 1012   BP: 122/71   Pulse: 68   Resp: 18   Temp: 97.8 °F (36.6 °C)   SpO2: 97%         General: Alert, NAD  HEENT:  EOMI, no sinus tenderness  Neck:  Supple, no thyromegaly,  no JVD  Lymph: no cervical, supraclavicular lymphadenopathy " bilaterally  Chest:  clear to auscultation bilaterally, no wheezing or crackles; no work of breathing noted  CV: RRR, no M/G/R, pulses 2+, equal.  Abd:  Soft, NT, ND, +BS, overweight  EXT:  no clubbing, no cyanosis, no edema b/l  Neuro:  A&Ox3, CN grossly intact, no focal deficits  Skin: No rashes or lesions noted       Result Review :   I have personally reviewed patient's labs and images.          Assessment and Plan      Patient Active Problem List   Diagnosis    Hyperlipidemia    Anxiety disorder, unspecified    Essential hypertension    Dyspnea on exertion    Lung nodules       Impression and Plan:    Chronic dyspnea on exertion  Lung nodules  Never smoker  Suspected sleep apnea    -chest CT done 4/22/2023.  Report noted a 4 mm nodule in the right lung base as well as a 3 mm nodule in the right lung base.  I am unable to view this image.  Will need repeat chest CT in 1 year  -Chest CT done 4/22/2024 showed no acute intrathoracic processes.  There are scattered small pulmonary nodules measuring largest 5 mm.  Will follow-up in 12 months, 04/2025  -PFTs done 7/25/2023 show isolated mild to moderate reduction in DLCO.  This may suggest pulmonary vascular disease, anemia, or early emphysema.  -2D echo show EF 62.7%.  Trace tricuspid valve regurgitation with E RVSP less than 35 mmHg.  No evidence of PH  -Patient is willing to try Breztri.  Will give some samples today.  -Will reorder home sleep study as she likely has sleep apnea  -Follow-up in 6 months or earlier as needed    Vaccination status: Patient is up-to-date with her COVID vaccination and influenza vaccination.  She did refused pneumonia vaccination at this time    Medications personally reviewed.    Follow Up   No follow-ups on file.  Patient was given instructions and counseling regarding her condition or for health maintenance advice. Please see specific information pulled into the AVS if appropriate.

## 2024-07-15 RX ORDER — BUDESONIDE, GLYCOPYRROLATE, AND FORMOTEROL FUMARATE 160; 9; 4.8 UG/1; UG/1; UG/1
2 AEROSOL, METERED RESPIRATORY (INHALATION) 2 TIMES DAILY
Qty: 2 EACH | Refills: 0 | COMMUNITY
Start: 2024-07-15 | End: 2024-07-16

## 2024-10-21 ENCOUNTER — TRANSCRIBE ORDERS (OUTPATIENT)
Dept: ADMINISTRATIVE | Facility: HOSPITAL | Age: 64
End: 2024-10-21
Payer: MEDICARE

## 2024-10-21 DIAGNOSIS — Z12.31 VISIT FOR SCREENING MAMMOGRAM: Primary | ICD-10-CM

## 2025-02-04 ENCOUNTER — OFFICE VISIT (OUTPATIENT)
Dept: PULMONOLOGY | Facility: CLINIC | Age: 65
End: 2025-02-04
Payer: MEDICARE

## 2025-02-04 VITALS
RESPIRATION RATE: 16 BRPM | TEMPERATURE: 98 F | OXYGEN SATURATION: 96 % | HEIGHT: 62 IN | HEART RATE: 84 BPM | WEIGHT: 162 LBS | DIASTOLIC BLOOD PRESSURE: 87 MMHG | BODY MASS INDEX: 29.81 KG/M2 | SYSTOLIC BLOOD PRESSURE: 135 MMHG

## 2025-02-04 DIAGNOSIS — R06.09 DYSPNEA ON EXERTION: ICD-10-CM

## 2025-02-04 DIAGNOSIS — R91.8 LUNG NODULES: ICD-10-CM

## 2025-02-04 DIAGNOSIS — G47.34 NOCTURNAL HYPOXIA: Primary | ICD-10-CM

## 2025-02-04 PROCEDURE — 1160F RVW MEDS BY RX/DR IN RCRD: CPT | Performed by: STUDENT IN AN ORGANIZED HEALTH CARE EDUCATION/TRAINING PROGRAM

## 2025-02-04 PROCEDURE — 1159F MED LIST DOCD IN RCRD: CPT | Performed by: STUDENT IN AN ORGANIZED HEALTH CARE EDUCATION/TRAINING PROGRAM

## 2025-02-04 PROCEDURE — 3075F SYST BP GE 130 - 139MM HG: CPT | Performed by: STUDENT IN AN ORGANIZED HEALTH CARE EDUCATION/TRAINING PROGRAM

## 2025-02-04 PROCEDURE — 3079F DIAST BP 80-89 MM HG: CPT | Performed by: STUDENT IN AN ORGANIZED HEALTH CARE EDUCATION/TRAINING PROGRAM

## 2025-02-04 PROCEDURE — 99214 OFFICE O/P EST MOD 30 MIN: CPT | Performed by: STUDENT IN AN ORGANIZED HEALTH CARE EDUCATION/TRAINING PROGRAM

## 2025-02-04 RX ORDER — GUAIFENESIN 1200 MG/1
1200 TABLET, EXTENDED RELEASE ORAL
COMMUNITY
Start: 2024-12-02

## 2025-02-04 RX ORDER — LOSARTAN POTASSIUM 50 MG/1
50 TABLET ORAL DAILY
COMMUNITY

## 2025-02-04 RX ORDER — LEVOCETIRIZINE DIHYDROCHLORIDE 5 MG/1
5 TABLET, FILM COATED ORAL EVERY EVENING
COMMUNITY

## 2025-02-04 NOTE — PROGRESS NOTES
Primary Care Provider  Ana Bonner PA   Referring Provider  No ref. provider found      Patient Complaint  Shortness of Breath, Sleep Apnea, and Follow-up      Subjective          Madai Ervin presents to [facility] PULMONARY & CRITICAL CARE MEDICINE      History of Presenting Illness  Madai Ervin is a 65 y.o. female with history of hypertension, GERD here for follow up for dyspnea on exertion.    Interval update: Patient is doing well today.  She has chronic dyspnea on exertion but has been used to it.  She does not use any inhalers at this time because she does not feel like they are benefiting her.  Her symptoms do not interfere with her day-to-day activities.  She remains a never smoker.  She is up-to-date with her vaccinations.  She denies fever, chills, sputum production, hemoptysis.  We discussed her last CT which showed a few small nodules.  She has a follow-up CT end of April.  She expressed understanding    Review of Systems  Constitutional:  No fever. No chills. No weakness.  Eyes: No pain, erythema, or discharge. No blurring of vision.  ENT:  No sore throat, URI symptoms.   Cardiovascular:  No chest pain. No palpitations. No lower extremity edema.  Respiratory:  No shortness of breath, cough, pleuritic chest pain. No hemoptysis. +chronic dyspnea on exertion  GI:  Normal appetite. No nausea, vomiting, diarrhea. No pain. No melena.  Musculoskeletal:  No arthralgias or myalgias.  Neurologic:  No headache. No weakness.    Family History   Problem Relation Age of Onset    Coronary artery disease Other     Heart disease Other     Coronary artery disease Mother     Hypertension Mother     Hyperlipidemia Mother     Ulcers Mother     Cancer Sister         3 SISTER : (UNSPECIFIED WHICH SISTER)    Lymphoma Brother         4 BROTHER 1    NOT SPECIFIED    Heart disease Maternal Grandmother     Heart disease Maternal Grandfather     Diabetes type II Paternal Grandmother          Social History     Socioeconomic History    Marital status:     Number of children: 3   Tobacco Use    Smoking status: Never    Smokeless tobacco: Never   Vaping Use    Vaping status: Never Used   Substance and Sexual Activity    Alcohol use: Never    Drug use: Never        Past Medical History:   Diagnosis Date    Abnormal results of kidney function studies     Acute sinusitis     Anxiety disorder, unspecified     Arm pain     Back pain     Body aches     Essential hypertension     Fatigue     Hearing loss     OTHER SPECIFIED FORMS OF HEARING LOSS, NEC    Heel pain     High risk medications (not anticoagulants) long-term use     Hyperlipidemia     Insect bite     Osteopenia     Pain, unspecified     Tinnitus, right ear         Immunization History   Administered Date(s) Administered    COVID-19 (PFIZER) Purple Cap Monovalent 08/29/2021    Fluzone  >6mos 11/20/2013    Hep A, 2 Dose 04/25/2018, 11/19/2018    Hepatitis A 04/25/2018, 11/19/2018    Influenza Injectable Mdck Pf Quad 09/28/2020, 11/07/2022    Influenza, Unspecified 11/29/2012, 10/31/2020    Tdap 05/05/2010, 11/21/2016       No Known Allergies       Current Outpatient Medications:     FT Mucus Relief 12HR 1200 MG tablet sustained-release 12 hour, Take 1 tablet by mouth., Disp: , Rfl:     albuterol sulfate  (90 Base) MCG/ACT inhaler, Inhale 2 puffs Every 4 (Four) Hours As Needed for Wheezing or Shortness of Air., Disp: 1 g, Rfl: 5    aspirin 81 MG EC tablet, Aspir-81 81 mg oral tablet,delayed release (DR/EC) take 1 tablet (81 mg) by oral route once daily   Active, Disp: , Rfl:     cetirizine (zyrTEC) 10 MG tablet, Zyrtec 10 mg oral tablet take 1 tablet (10 mg) by oral route once daily   Active, Disp: , Rfl:     Cholecalciferol 25 MCG (1000 UT) capsule, Vitamin D3 25 mcg (1,000 unit) oral capsule take 1 capsule by oral route daily   Active, Disp: , Rfl:     cyanocobalamin (VITAMIN B-12) 50 MCG tablet, Take 10 tablets by mouth Daily.,  "Disp: , Rfl:     escitalopram (LEXAPRO) 20 MG tablet, TAKE ONE TABLET BY MOUTH DAILY, Disp: 90 tablet, Rfl: 1    FeroSul 325 (65 Fe) MG tablet, Take 1 tablet by mouth Daily With Breakfast., Disp: , Rfl:     fluticasone (FLONASE) 50 MCG/ACT nasal spray, INSTILL ONE SPRAY INTO EACH NOSTRIL EVERY DAY, Disp: , Rfl:     fluvastatin XL (LESCOL XL) 80 MG 24 hr tablet, TAKE ONE TABLET BY MOUTH EVERY EVENING, Disp: 90 tablet, Rfl: 0    folic acid (FOLVITE) 1 MG tablet, Take 1 tablet by mouth Every Morning., Disp: , Rfl:     GARLIC PO, Take  by mouth Daily., Disp: , Rfl:     ibuprofen (ADVIL,MOTRIN) 600 MG tablet, Take 600 mg by mouth As Needed. take 1 tablet by mouth every 6 to 8 hours as needed (Patient not taking: Reported on 7/12/2023), Disp: , Rfl:     levothyroxine (SYNTHROID, LEVOTHROID) 25 MCG tablet, Take 1 tablet by mouth., Disp: , Rfl:     lisinopril (PRINIVIL,ZESTRIL) 5 MG tablet, Take 1 tablet by mouth Daily., Disp: 90 tablet, Rfl: 0    montelukast (SINGULAIR) 10 MG tablet, Take 1 tablet by mouth every night at bedtime. (Patient not taking: Reported on 7/10/2024), Disp: , Rfl:     pantoprazole (PROTONIX) 20 MG EC tablet, Take 1 tablet by mouth Every Morning. (Patient not taking: Reported on 9/28/2023), Disp: , Rfl:      Objective     Vital Signs:   Resp 16   Ht 157.5 cm (62\")   Wt 73.5 kg (162 lb)   BMI 29.63 kg/m²     Physical Exam  Vitals:    02/04/25 1032   Resp: 16         General: Alert, NAD  HEENT:  EOMI, no sinus tenderness  Neck:  Supple, no thyromegaly,  no JVD  Lymph: no cervical, supraclavicular lymphadenopathy bilaterally  Chest:  clear to auscultation bilaterally, no wheezing or crackles; no work of breathing noted  CV: RRR, no M/G/R, pulses 2+, equal.  Abd:  Soft, NT, ND, +BS, overweight  EXT:  no clubbing, no cyanosis, no edema b/l  Neuro:  A&Ox3, CN grossly intact, no focal deficits  Skin: No rashes or lesions noted       Result Review :   I have personally reviewed patient's labs and images. "          Assessment and Plan      Patient Active Problem List   Diagnosis    Hyperlipidemia    Anxiety disorder, unspecified    Essential hypertension    Dyspnea on exertion    Lung nodules    Nocturnal hypoxia       Impression and Plan:    Chronic dyspnea on exertion  Lung nodules  Never smoker  Suspected sleep apnea    -Chest CT done 4/22/2024 showed no acute intrathoracic processes.  There are scattered small pulmonary nodules measuring largest 5 mm.    -Repeat CT ordered for over 4/2025  -PFTs done 7/25/2023 show isolated mild to moderate reduction in DLCO.  This may suggest pulmonary vascular disease, anemia, or early emphysema.  -2D echo show EF 62.7%.  Trace tricuspid valve regurgitation with E RVSP less than 35 mmHg.  No evidence of PH  -Patient has albuterol and Breztri but does not find them useful.  -Follow-up in 6 months or earlier as needed    Vaccination status: Patient is up-to-date with her COVID vaccination and influenza vaccination.  She did refused pneumonia vaccination at this time    Medications personally reviewed.    Follow Up   No follow-ups on file.  Patient was given instructions and counseling regarding her condition or for health maintenance advice. Please see specific information pulled into the AVS if appropriate.

## 2025-02-13 ENCOUNTER — HOSPITAL ENCOUNTER (OUTPATIENT)
Dept: MAMMOGRAPHY | Facility: HOSPITAL | Age: 65
Discharge: HOME | End: 2025-02-13
Admitting: PHYSICIAN ASSISTANT
Payer: MEDICARE

## 2025-02-13 DIAGNOSIS — Z12.31 VISIT FOR SCREENING MAMMOGRAM: ICD-10-CM

## 2025-02-13 PROCEDURE — 77063 BREAST TOMOSYNTHESIS BI: CPT

## 2025-02-13 PROCEDURE — 77067 SCR MAMMO BI INCL CAD: CPT

## 2025-04-21 ENCOUNTER — HOSPITAL ENCOUNTER (OUTPATIENT)
Dept: CT IMAGING | Facility: HOSPITAL | Age: 65
Discharge: HOME | End: 2025-04-21
Admitting: STUDENT IN AN ORGANIZED HEALTH CARE EDUCATION/TRAINING PROGRAM
Payer: MEDICARE

## 2025-04-21 DIAGNOSIS — R91.8 LUNG NODULES: ICD-10-CM

## 2025-04-21 PROCEDURE — 71250 CT THORAX DX C-: CPT
